# Patient Record
Sex: MALE | Race: WHITE | NOT HISPANIC OR LATINO | Employment: FULL TIME | URBAN - METROPOLITAN AREA
[De-identification: names, ages, dates, MRNs, and addresses within clinical notes are randomized per-mention and may not be internally consistent; named-entity substitution may affect disease eponyms.]

---

## 2017-03-06 ENCOUNTER — DOCUMENTATION ONLY (OUTPATIENT)
Dept: LAB | Facility: CLINIC | Age: 27
End: 2017-03-06

## 2017-03-06 DIAGNOSIS — Z13.220 SCREENING CHOLESTEROL LEVEL: Primary | ICD-10-CM

## 2017-03-06 DIAGNOSIS — Z13.1 SCREENING FOR DIABETES MELLITUS: ICD-10-CM

## 2017-03-06 NOTE — PROGRESS NOTES
Reviewed. I can place orders.    Please let patient know he's about 4 months early for a physical - FYI.    Lamin Zuniga, SANTYS, PA-C

## 2017-03-06 NOTE — PROGRESS NOTES
Tried calling patient. His voicemail is full. Need to relay Naveed's message below.    Sandra Varela MA

## 2017-03-07 NOTE — PROGRESS NOTES
Called and spoke with patient. He is going to check with his insurance to see if they will cover his physical early. He will call back to reschedule if needed.    Sandra Varela MA

## 2017-03-08 DIAGNOSIS — Z13.220 SCREENING CHOLESTEROL LEVEL: ICD-10-CM

## 2017-03-08 DIAGNOSIS — Z13.1 SCREENING FOR DIABETES MELLITUS: ICD-10-CM

## 2017-03-08 PROCEDURE — 82947 ASSAY GLUCOSE BLOOD QUANT: CPT | Performed by: PHYSICIAN ASSISTANT

## 2017-03-08 PROCEDURE — 36415 COLL VENOUS BLD VENIPUNCTURE: CPT | Performed by: PHYSICIAN ASSISTANT

## 2017-03-08 PROCEDURE — 80061 LIPID PANEL: CPT | Performed by: PHYSICIAN ASSISTANT

## 2017-03-09 LAB
CHOLEST SERPL-MCNC: 163 MG/DL
GLUCOSE SERPL-MCNC: 86 MG/DL (ref 70–99)
HDLC SERPL-MCNC: 38 MG/DL
LDLC SERPL CALC-MCNC: 103 MG/DL
NONHDLC SERPL-MCNC: 125 MG/DL
TRIGL SERPL-MCNC: 108 MG/DL

## 2017-03-10 ENCOUNTER — OFFICE VISIT (OUTPATIENT)
Dept: FAMILY MEDICINE | Facility: CLINIC | Age: 27
End: 2017-03-10
Payer: COMMERCIAL

## 2017-03-10 VITALS
BODY MASS INDEX: 25.99 KG/M2 | SYSTOLIC BLOOD PRESSURE: 118 MMHG | TEMPERATURE: 98.2 F | DIASTOLIC BLOOD PRESSURE: 76 MMHG | WEIGHT: 171.5 LBS | HEART RATE: 60 BPM | HEIGHT: 68 IN

## 2017-03-10 DIAGNOSIS — Z00.00 ROUTINE GENERAL MEDICAL EXAMINATION AT A HEALTH CARE FACILITY: Primary | ICD-10-CM

## 2017-03-10 DIAGNOSIS — M45.9 AS (ANKYLOSING SPONDYLITIS) (H): ICD-10-CM

## 2017-03-10 PROCEDURE — 99395 PREV VISIT EST AGE 18-39: CPT | Performed by: PHYSICIAN ASSISTANT

## 2017-03-10 NOTE — MR AVS SNAPSHOT
After Visit Summary   3/10/2017    Dayday Buck    MRN: [id number]           Patient Information     Date Of Birth          1990        Visit Information        Provider Department      3/10/2017 10:00 AM Lamin Zuniga PA-C [facility]        Today's Diagnoses     Routine general medical examination at a health care facility    -  1    AS (ankylosing spondylitis) (H)          Care Instructions      Preventive Health Recommendations  Male Ages 26 - 39    Yearly exam:             See your health care provider every year in order to  o   Review health changes.   o   Discuss preventive care.    o   Review your medicines if your doctor has prescribed any.    You should be tested each year for STDs (sexually transmitted diseases), if you re at risk.     After age 35, talk to your provider about cholesterol testing. If you are at risk for heart disease, have your cholesterol tested at least every 5 years.     If you are at risk for diabetes, you should have a diabetes test (fasting glucose).  Shots: Get a flu shot each year. Get a tetanus shot every 10 years.     Nutrition:    Eat at least 5 servings of fruits and vegetables daily.     Eat whole-grain bread, whole-wheat pasta and brown rice instead of white grains and rice.     Talk to your provider about Calcium and Vitamin D.     Lifestyle    Exercise for at least 150 minutes a week (30 minutes a day, 5 days a week). This will help you control your weight and prevent disease.     Limit alcohol to one drink per day.     No smoking.     Wear sunscreen to prevent skin cancer.     See your dentist every six months for an exam and cleaning.           Follow-ups after your visit        Additional Services     OPHTHALMOLOGY ADULT REFERRAL       Your provider has referred you to: [facility]: [facility] [place] [phone]   [email or web address]  UMP: [facility] [place] [phone]   [email or web address]    Please be aware that coverage of these services is subject to the terms and limitations of your health insurance plan.  Call member services at your health plan with any benefit or coverage questions.      Please bring the following with you to your appointment:    (1) Any X-Rays, CTs or MRIs which have been performed.  Contact the facility where they were done to arrange for  prior to your scheduled appointment.    (2) List of current medications  (3) This referral request   (4) Any documents/labs given to you for this referral            RHEUMATOLOGY REFERRAL       Your provider has referred you to: FMG: [facility] [place] [phone]   [email or web address]  FMG: [place] [facility] [place] [phone]   [email or web address]    Please be aware that coverage of these services is subject to the terms and limitations of your health insurance plan.  Call member services at your health plan with any benefit or coverage questions.      Please bring the following with you to your appointment:    (1) Any X-Rays, CTs or MRIs which have been performed.  Contact the facility where they were done to arrange for  prior to your scheduled appointment.    (2) List of current medications   (3) This referral request   (4) Any documents/labs given to you for this referral                  Who to contact     If you have questions or need follow up information about today's clinic visit or your schedule please contact [facility] directly at [phone].  Normal or non-critical lab and imaging results will be communicated to you by MyChart, letter or phone within 4 business days after the clinic has received the results. If you do not hear from us within 7 days, please contact the clinic through MyChart or phone. If you have a critical or  "abnormal lab result, we will notify you by phone as soon as possible.  Submit refill requests through [employer] or call your pharmacy and they will forward the refill request to us. Please allow 3 business days for your refill to be completed.          Additional Information About Your Visit        MyChart Information     [employer] lets you send messages to your doctor, view your test results, renew your prescriptions, schedule appointments and more. To sign up, go to [email or web address] . Click on \"Log in\" on the left side of the screen, which will take you to the Welcome page. Then click on \"Sign up Now\" on the right side of the page.     You will be asked to enter the access code listed below, as well as some personal information. Please follow the directions to create your username and password.     Your access code is: [email or web address]  Expires: 2017 11:04 AM     Your access code will  in 90 days. If you need help or a new code, please call your [place] clinic or [phone].        Care EveryWhere ID     This is your Care EveryWhere ID. This could be used by other organizations to access your [place] medical records  [id number]        Your Vitals Were     Pulse Temperature Height BMI (Body Mass Index)          60 98.2  F (36.8  C) (Oral) 5' 8\" (1.727 m) 26.08 kg/m2         Blood Pressure from Last 3 Encounters:   03/10/17 118/76   16 123/76    Weight from Last 3 Encounters:   03/10/17 171 lb 8 oz (77.8 kg)   16 174 lb 4.8 oz (79.1 kg)              We Performed the Following     OPHTHALMOLOGY ADULT REFERRAL     RHEUMATOLOGY REFERRAL        Primary Care Provider Office Phone # Fax #    Irish Alicia [phone]       [facility]    [address]   [facility] [address]        Thank you!     Thank you for choosing [facility]  for your care. Our goal is always to provide you with excellent care. Hearing back from our " patients is one way we can continue to improve our services. Please take a few minutes to complete the written survey that you may receive in the mail after your visit with us. Thank you!             Your Updated Medication List - Protect others around you: Learn how to safely use, store and throw away your medicines at www.disposemymeds.org.          This list is accurate as of: 3/10/17 11:04 AM.  Always use your most recent med list.                   Brand Name Dispense Instructions for use    adalimumab 40 MG/0.8ML prefilled syringe kit    HUMIRA     Inject 40 mg Subcutaneous every 14 days

## 2017-03-10 NOTE — NURSING NOTE
"Chief Complaint   Patient presents with     Physical     Establish Care       Initial There were no vitals taken for this visit. Estimated body mass index is 25.74 kg/(m^2) as calculated from the following:    Height as of 7/18/16: 5' 9\" (1.753 m).    Weight as of 7/18/16: 174 lb 4.8 oz (79.1 kg).  Medication Reconciliation: complete   Courtney Barba CMA      "

## 2017-03-10 NOTE — PROGRESS NOTES
SUBJECTIVE:     CC: Dayday Buck is an 26 year old male who presents for preventative health visit.     Healthy Habits:    Do you get at least three servings of calcium containing foods daily (dairy, green leafy vegetables, etc.)? yes    Amount of exercise or daily activities, outside of work: 5 day(s) per week    Problems taking medications regularly No    Medication side effects: No    Have you had an eye exam in the past two years? no    Do you see a dentist twice per year? no  Do you have sleep apnea, excessive snoring or daytime drowsiness?no      Patient would like to get referral for eye doctor.  Patient would also like to Establish Care and discuss getting updated lab work done.    Today's PHQ-2 Score:   PHQ-2 ( 1999 Pfizer) 3/10/2017 7/18/2016   Q1: Little interest or pleasure in doing things 0 0   Q2: Feeling down, depressed or hopeless 0 0   PHQ-2 Score 0 0       Abuse: Current or Past(Physical, Sexual or Emotional)- No  Do you feel safe in your environment - Yes    Social History   Substance Use Topics     Smoking status: Never Smoker     Smokeless tobacco: Never Used     Alcohol use 0.0 oz/week     0 Standard drinks or equivalent per week      Comment: 1 drinks a week     The patient does not drink >3 drinks per day nor >7 drinks per week.    Last PSA: No results found for: PSA    Recent Labs   Lab Test  03/08/17   1108   CHOL  163   HDL  38*   LDL  103*   TRIG  108   NHDL  125       Reviewed orders with patient. Reviewed health maintenance and updated orders accordingly - Yes    Reviewed and updated as needed this visit by clinical staff  Tobacco  Allergies  Med Hx  Surg Hx  Fam Hx  Soc Hx        Reviewed and updated as needed this visit by Provider  Allergies            ROS:  C: NEGATIVE for fever, chills, change in weight  I: NEGATIVE for worrisome rashes, moles or lesions  E: NEGATIVE for vision changes or irritation  ENT: NEGATIVE for ear, mouth and throat problems  R: NEGATIVE for  "significant cough or SOB  CV: NEGATIVE for chest pain, palpitations or peripheral edema  GI: NEGATIVE for nausea, abdominal pain, heartburn, or change in bowel habits   male: negative for dysuria, hematuria, decreased urinary stream, erectile dysfunction, urethral discharge  M: NEGATIVE for significant arthralgias or myalgia  N: NEGATIVE for weakness, dizziness or paresthesias  P: NEGATIVE for changes in mood or affect    Labs reviewed in EPIC  OBJECTIVE:     /76 (BP Location: Right arm, Cuff Size: Adult Regular)  Pulse 60  Temp 98.2  F (36.8  C) (Oral)  Ht 5' 8\" (1.727 m)  Wt 171 lb 8 oz (77.8 kg)  BMI 26.08 kg/m2  EXAM:  GENERAL: healthy, alert and no distress  EYES: Eyes grossly normal to inspection, PERRL and conjunctivae and sclerae normal  HENT: ear canals and TM's normal, nose and mouth without ulcers or lesions  NECK: no adenopathy, no asymmetry, masses, or scars and thyroid normal to palpation  RESP: lungs clear to auscultation - no rales, rhonchi or wheezes  CV: regular rate and rhythm, normal S1 S2, no S3 or S4, no murmur, click or rub, no peripheral edema and peripheral pulses strong  ABDOMEN: soft, nontender, no hepatosplenomegaly, no masses and bowel sounds normal  MS: no gross musculoskeletal defects noted, no edema  SKIN: no suspicious lesions or rashes  NEURO: Normal strength and tone, mentation intact and speech normal  PSYCH: mentation appears normal, affect normal/bright  LYMPH: no cervical, supraclavicular, axillary, or inguinal adenopathy    ASSESSMENT/PLAN:     (Z00.00) Routine general medical examination at a health care facility  (primary encounter diagnosis)  Comment: Well person   Plan: OPHTHALMOLOGY ADULT REFERRAL        Diet, exercise, wellness and other preventive recommendations related to health maintenance were discussed.  Follow up as needed for acute issues.  Physical exam in 1 year.     (M45.9) AS (ankylosing spondylitis) (H)  Comment:   Plan: RHEUMATOLOGY REFERRAL        " "Needs a new rheumatologist       COUNSELING:  Reviewed preventive health counseling, as reflected in patient instructions         reports that he has never smoked. He has never used smokeless tobacco.    Estimated body mass index is 26.08 kg/(m^2) as calculated from the following:    Height as of this encounter: 5' 8\" (1.727 m).    Weight as of this encounter: 171 lb 8 oz (77.8 kg).       Counseling Resources:  ATP IV Guidelines  Pooled Cohorts Equation Calculator  FRAX Risk Assessment  ICSI Preventive Guidelines  Dietary Guidelines for Americans, 2010  USDA's MyPlate  ASA Prophylaxis  Lung CA Screening    FABIENNE ALEGRE PA-C  [facility]  "

## 2017-03-17 ENCOUNTER — MYC MEDICAL ADVICE (OUTPATIENT)
Dept: FAMILY MEDICINE | Facility: CLINIC | Age: 27
End: 2017-03-17

## 2017-03-17 NOTE — TELEPHONE ENCOUNTER
Huddled with provider. Okay to have patient see PCP next week as long as no focal neuro deficits. Abimbola sent, and called and left VM.    Tj Kilgore RN

## 2017-03-24 ENCOUNTER — OFFICE VISIT (OUTPATIENT)
Dept: FAMILY MEDICINE | Facility: CLINIC | Age: 27
End: 2017-03-24
Payer: COMMERCIAL

## 2017-03-24 VITALS
HEART RATE: 64 BPM | DIASTOLIC BLOOD PRESSURE: 68 MMHG | HEIGHT: 68 IN | BODY MASS INDEX: 26.52 KG/M2 | WEIGHT: 175 LBS | SYSTOLIC BLOOD PRESSURE: 118 MMHG | TEMPERATURE: 98.6 F

## 2017-03-24 DIAGNOSIS — R55 SYNCOPE, UNSPECIFIED SYNCOPE TYPE: Primary | ICD-10-CM

## 2017-03-24 LAB
ALBUMIN SERPL-MCNC: 4.3 G/DL (ref 3.4–5)
ALP SERPL-CCNC: 71 U/L (ref 40–150)
ALT SERPL W P-5'-P-CCNC: 29 U/L (ref 0–70)
ANION GAP SERPL CALCULATED.3IONS-SCNC: 6 MMOL/L (ref 3–14)
AST SERPL W P-5'-P-CCNC: 24 U/L (ref 0–45)
BILIRUB SERPL-MCNC: 0.5 MG/DL (ref 0.2–1.3)
BUN SERPL-MCNC: 13 MG/DL (ref 7–30)
CALCIUM SERPL-MCNC: 8.7 MG/DL (ref 8.5–10.1)
CHLORIDE SERPL-SCNC: 104 MMOL/L (ref 94–109)
CO2 SERPL-SCNC: 29 MMOL/L (ref 20–32)
CREAT SERPL-MCNC: 0.82 MG/DL (ref 0.66–1.25)
ERYTHROCYTE [DISTWIDTH] IN BLOOD BY AUTOMATED COUNT: 11.9 % (ref 10–15)
ERYTHROCYTE [SEDIMENTATION RATE] IN BLOOD BY WESTERGREN METHOD: 7 MM/H (ref 0–15)
GFR SERPL CREATININE-BSD FRML MDRD: NORMAL ML/MIN/1.7M2
GLUCOSE SERPL-MCNC: 80 MG/DL (ref 70–99)
HCT VFR BLD AUTO: 40.6 % (ref 40–53)
HGB BLD-MCNC: 14.2 G/DL (ref 13.3–17.7)
MCH RBC QN AUTO: 30.3 PG (ref 26.5–33)
MCHC RBC AUTO-ENTMCNC: 35 G/DL (ref 31.5–36.5)
MCV RBC AUTO: 87 FL (ref 78–100)
PLATELET # BLD AUTO: 216 10E9/L (ref 150–450)
POTASSIUM SERPL-SCNC: 4.1 MMOL/L (ref 3.4–5.3)
PROT SERPL-MCNC: 7.6 G/DL (ref 6.8–8.8)
RBC # BLD AUTO: 4.68 10E12/L (ref 4.4–5.9)
SODIUM SERPL-SCNC: 139 MMOL/L (ref 133–144)
TSH SERPL DL<=0.05 MIU/L-ACNC: 3.71 MU/L (ref 0.4–4)
VIT B12 SERPL-MCNC: 476 PG/ML (ref 193–986)
WBC # BLD AUTO: 6.3 10E9/L (ref 4–11)

## 2017-03-24 PROCEDURE — 80053 COMPREHEN METABOLIC PANEL: CPT | Performed by: PHYSICIAN ASSISTANT

## 2017-03-24 PROCEDURE — 85027 COMPLETE CBC AUTOMATED: CPT | Performed by: PHYSICIAN ASSISTANT

## 2017-03-24 PROCEDURE — 93000 ELECTROCARDIOGRAM COMPLETE: CPT | Performed by: PHYSICIAN ASSISTANT

## 2017-03-24 PROCEDURE — 84443 ASSAY THYROID STIM HORMONE: CPT | Performed by: PHYSICIAN ASSISTANT

## 2017-03-24 PROCEDURE — 36415 COLL VENOUS BLD VENIPUNCTURE: CPT | Performed by: PHYSICIAN ASSISTANT

## 2017-03-24 PROCEDURE — 85652 RBC SED RATE AUTOMATED: CPT | Performed by: PHYSICIAN ASSISTANT

## 2017-03-24 PROCEDURE — 82607 VITAMIN B-12: CPT | Performed by: PHYSICIAN ASSISTANT

## 2017-03-24 PROCEDURE — 99214 OFFICE O/P EST MOD 30 MIN: CPT | Performed by: PHYSICIAN ASSISTANT

## 2017-03-24 NOTE — MR AVS SNAPSHOT
After Visit Summary   3/24/2017    Dayday Buck    MRN: [id number]           Patient Information     Date Of Birth          1990        Visit Information        Provider Department      3/24/2017 9:00 AM Lamin Zuniga PA-C [facility]        Today's Diagnoses     Syncope, unspecified syncope type    -  1      Care Instructions    [facility]   Discharged by : Lorraine LEWIS Certified Medical Assistant (AAMA)   Paper scripts provided to patient : 0   If you have any questions regarding to your visit please contact your care team:   [facility] Clinic Hours Telephone Number   JAN Pete Dr., PA-C    7am-7pm Monday - Thursday   7am-5pm Fridays  [phone]   (Appointment scheduling available 24/7)   RN Line   [phone] option 2       What options do I have for visits at the clinic other than the traditional office visit?   To expand how we care for you, many of our providers are utilizing electronic visits (e-visits) and telephone visits, when medically appropriate, for interactions with their patients rather than a visit in the clinic. We also offer nurse visits for many medical concerns. Just like any other service, we will bill your insurance company for this type of visit based on time spent on the phone with your provider. Not all insurance companies cover these visits. Please check with your medical insurance if this type of visit is covered. You will be responsible for any charges that are not paid by your insurance.     E-visits via [employer]: generally incur a $35.00 fee.     Telephone visits:   Time spent on the phone: *charged based on time that is spent on the phone in increments of 10 minutes. Estimated cost:   5-10 mins $30.00   11-20 mins. $59.00   21-30 mins. $85.00   Use [employer] (secure email communication and access to your chart) to send your primary care provider a message or make an  "appointment. Ask someone on your Team how to sign up for [employer].   For a Price Quote for your services, please call our Consumer Price Line at [phone].   As always, Thank you for trusting us with your health care needs!                [facility]:    Scheduling Appointments  [facility]  Call: [phone]    [facility]  Call: [phone]    [facility]  Call: [phone]                  Follow-ups after your visit        Who to contact     If you have questions or need follow up information about today's clinic visit or your schedule please contact [facility] directly at [phone].  Normal or non-critical lab and imaging results will be communicated to you by Cloud[employer], letter or phone within 4 business days after the clinic has received the results. If you do not hear from us within 7 days, please contact the clinic through [employer] or phone. If you have a critical or abnormal lab result, we will notify you by phone as soon as possible.  Submit refill requests through [employer] or call your pharmacy and they will forward the refill request to us. Please allow 3 business days for your refill to be completed.          Additional Information About Your Visit        [employer] Information     [employer] gives you secure access to your electronic health record. If you see a primary care provider, you can also send messages to your care team and make appointments. If you have questions, please call your primary care clinic.  If you do not have a primary care provider, please call [phone] and they will assist you.        Care EveryWhere ID     This is your Care EveryWhere ID. This could be used by other organizations to access your [place] medical records  [id number]        Your Vitals Were     Pulse Temperature Height BMI (Body Mass Index)          64 98.6  F (37  C) (Oral) 5' 8\" (1.727 m) 26.61 kg/m2 "         Blood Pressure from Last 3 Encounters:   03/24/17 118/68   03/10/17 118/76   07/18/16 123/76    Weight from Last 3 Encounters:   03/24/17 175 lb (79.4 kg)   03/10/17 171 lb 8 oz (77.8 kg)   07/18/16 174 lb 4.8 oz (79.1 kg)              We Performed the Following     CBC with platelets     Comprehensive metabolic panel (BMP + Alb, Alk Phos, ALT, AST, Total. Bili, TP)     EKG 12-lead complete w/read - Clinics     ESR: Erythrocyte sedimentation rate     TSH     Vitamin B12        Primary Care Provider Office Phone # Fax #    Lamin Zuniga PA-C [phone]       [address] [address]        Thank you!     Thank you for choosing [facility]  for your care. Our goal is always to provide you with excellent care. Hearing back from our patients is one way we can continue to improve our services. Please take a few minutes to complete the written survey that you may receive in the mail after your visit with us. Thank you!             Your Updated Medication List - Protect others around you: Learn how to safely use, store and throw away your medicines at www.disposemymeds.org.          This list is accurate as of: 3/24/17  9:45 AM.  Always use your most recent med list.                   Brand Name Dispense Instructions for use    adalimumab 40 MG/0.8ML prefilled syringe kit    HUMIRA     Inject 40 mg Subcutaneous every 14 days

## 2017-03-24 NOTE — NURSING NOTE
"Chief Complaint   Patient presents with     Syncope       Initial /68 (BP Location: Right arm, Cuff Size: Adult Large)  Pulse 64  Temp 98.6  F (37  C) (Oral)  Ht 5' 8\" (1.727 m)  Wt 175 lb (79.4 kg)  BMI 26.61 kg/m2 Estimated body mass index is 26.61 kg/(m^2) as calculated from the following:    Height as of this encounter: 5' 8\" (1.727 m).    Weight as of this encounter: 175 lb (79.4 kg).  Medication Reconciliation: complete     Sandra Varela MA     "

## 2017-03-24 NOTE — PATIENT INSTRUCTIONS
[facility]   Discharged by : Lorraine LEWIS Certified Medical Assistant (AAMA)   Paper scripts provided to patient : 0   If you have any questions regarding to your visit please contact your care team:   [facility] Clinic Hours Telephone Number   JAN Pete Dr., PA-C    7am-7pm Monday - Thursday   7am-5pm Fridays  [phone]   (Appointment scheduling available 24/7)   RN Line   [phone] option 2       What options do I have for visits at the clinic other than the traditional office visit?   To expand how we care for you, many of our providers are utilizing electronic visits (e-visits) and telephone visits, when medically appropriate, for interactions with their patients rather than a visit in the clinic. We also offer nurse visits for many medical concerns. Just like any other service, we will bill your insurance company for this type of visit based on time spent on the phone with your provider. Not all insurance companies cover these visits. Please check with your medical insurance if this type of visit is covered. You will be responsible for any charges that are not paid by your insurance.     E-visits via Captalishart: generally incur a $35.00 fee.     Telephone visits:   Time spent on the phone: *charged based on time that is spent on the phone in increments of 10 minutes. Estimated cost:   5-10 mins $30.00   11-20 mins. $59.00   21-30 mins. $85.00   Use Captalishart (secure email communication and access to your chart) to send your primary care provider a message or make an appointment. Ask someone on your Team how to sign up for [employer].   For a Price Quote for your services, please call our Consumer Price Line at [phone].   As always, Thank you for trusting us with your health care needs!                [facility] [facility]:    Scheduling Appointments  Rob Bateman [facility]  Call: [phone]    [facility]  Call: [phone]    [facility]  Call: [phone]

## 2017-03-24 NOTE — PROGRESS NOTES
"  SUBJECTIVE:                                                    Dayday Buck is a 26 year old male who presents to clinic today for the following health issues:    Concern - syncope     Onset: one week ago    Description:   Patient fainted one week ago. Patient states that he didn't feel good that day. He had a headache and felt tired. He thinks he was unconscious for 30-60 seconds. He hit his forehead and left arm.    Intensity: NA    Progression of Symptoms:  NA    Accompanying Signs & Symptoms:  Felt hot, elevated heart rate, nausea       Previous history of similar problem:   none    Precipitating factors:   Worsened by: na    Alleviating factors:  Improved by: na       Therapies Tried and outcome: Fluids     No triggers. Was feeling mildly unwell perhaps earlier in the week. Denies illness though. 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 His protein, electrolyte intakes are good.     He states he was watching [employer] games, stood up and went to Urinate and was standing there a while when he felt racing heart and sudden severe nausea. Leaned forward and next thing he knew he was waking. Was out for at 30-60 seconds. No confusion or loss of bowel after.     He's been feeling fine since that time.     Problem list and histories reviewed & adjusted, as indicated.  Additional history: as documented    Labs reviewed in EPIC    Reviewed and updated as needed this visit by clinical staff       Reviewed and updated as needed this visit by Provider         ROS:  Constitutional, HEENT, cardiovascular, pulmonary, gi and gu systems are negative, except as otherwise noted.    OBJECTIVE:                                                    /68 (BP Location: Right arm, Cuff Size: Adult Large)  Pulse 64  Temp 98.6  F (37  C) (Oral)  Ht 5' 8\" (1.727 m)  Wt 175 lb (79.4 kg)  BMI 26.61 kg/m2  Body mass index is 26.61 kg/(m^2).  GENERAL: healthy, alert and no " distress  EYES: Eyes grossly normal to inspection, PERRL and conjunctivae and sclerae normal  HENT: ear canals and TM's normal, nose and mouth without ulcers or lesions  NECK: no adenopathy, no asymmetry, masses, or scars and thyroid normal to palpation  RESP: lungs clear to auscultation - no rales, rhonchi or wheezes  CV: regular rate and rhythm, normal S1 S2, no S3 or S4, no murmur, click or rub, no peripheral edema and peripheral pulses strong  ABDOMEN: soft, nontender, no hepatosplenomegaly, no masses and bowel sounds normal  MS: no gross musculoskeletal defects noted, no edema  SKIN: no suspicious lesions or rashes  NEURO: Normal strength and tone, mentation intact and speech normal  PSYCH: mentation appears normal, affect normal/bright    Diagnostic Test Results:  EKG - sinus  Bradycardia but normal      ASSESSMENT/PLAN:                                                      (R55) Syncope, unspecified syncope type  (primary encounter diagnosis)  Comment:   Plan: EKG 12-lead complete w/read - Clinics,         Comprehensive metabolic panel (BMP + Alb, Alk         Phos, ALT, AST, Total. Bili, TP), CBC with         platelets, ESR: Erythrocyte sedimentation rate,        TSH, Vitamin B12        Unclear cause. Reviewed differential. No signs or symptoms of seizure activity. Doesn't sound cardiac. Probably vasovagal from being mildly ill and standing up to use restroom and urinating. Reviewed plan to monitor and watch for symptoms. Labs ordered. His Sinus Bradycardia is probably baseline because he's a long distance athlete. Ensure good hydration and electrolyte repletion     FABIENNE ALEGRE PA-C  [facility]

## 2017-10-10 ENCOUNTER — OFFICE VISIT (OUTPATIENT)
Dept: RHEUMATOLOGY | Facility: CLINIC | Age: 27
End: 2017-10-10
Payer: COMMERCIAL

## 2017-10-10 VITALS
SYSTOLIC BLOOD PRESSURE: 130 MMHG | DIASTOLIC BLOOD PRESSURE: 76 MMHG | RESPIRATION RATE: 14 BRPM | OXYGEN SATURATION: 98 % | HEART RATE: 48 BPM | BODY MASS INDEX: 27.16 KG/M2 | WEIGHT: 178.6 LBS

## 2017-10-10 DIAGNOSIS — Z23 NEED FOR PROPHYLACTIC VACCINATION AND INOCULATION AGAINST INFLUENZA: ICD-10-CM

## 2017-10-10 DIAGNOSIS — M45.8 ANKYLOSING SPONDYLITIS OF SACRAL REGION (H): Primary | ICD-10-CM

## 2017-10-10 LAB
ALBUMIN SERPL-MCNC: 4.3 G/DL (ref 3.4–5)
ALP SERPL-CCNC: 67 U/L (ref 40–150)
ALT SERPL W P-5'-P-CCNC: 47 U/L (ref 0–70)
AST SERPL W P-5'-P-CCNC: 31 U/L (ref 0–45)
BASOPHILS # BLD AUTO: 0 10E9/L (ref 0–0.2)
BASOPHILS NFR BLD AUTO: 0.3 %
BILIRUB DIRECT SERPL-MCNC: 0.1 MG/DL (ref 0–0.2)
BILIRUB SERPL-MCNC: 0.6 MG/DL (ref 0.2–1.3)
CREAT SERPL-MCNC: 1.08 MG/DL (ref 0.66–1.25)
CRP SERPL-MCNC: <2.9 MG/L (ref 0–8)
DIFFERENTIAL METHOD BLD: NORMAL
EOSINOPHIL # BLD AUTO: 0.3 10E9/L (ref 0–0.7)
EOSINOPHIL NFR BLD AUTO: 3.9 %
ERYTHROCYTE [DISTWIDTH] IN BLOOD BY AUTOMATED COUNT: 11.8 % (ref 10–15)
ERYTHROCYTE [SEDIMENTATION RATE] IN BLOOD BY WESTERGREN METHOD: 6 MM/H (ref 0–15)
GFR SERPL CREATININE-BSD FRML MDRD: 81 ML/MIN/1.7M2
HCT VFR BLD AUTO: 40.3 % (ref 40–53)
HGB BLD-MCNC: 14.5 G/DL (ref 13.3–17.7)
LYMPHOCYTES # BLD AUTO: 2.2 10E9/L (ref 0.8–5.3)
LYMPHOCYTES NFR BLD AUTO: 32 %
MCH RBC QN AUTO: 30.9 PG (ref 26.5–33)
MCHC RBC AUTO-ENTMCNC: 36 G/DL (ref 31.5–36.5)
MCV RBC AUTO: 86 FL (ref 78–100)
MONOCYTES # BLD AUTO: 0.7 10E9/L (ref 0–1.3)
MONOCYTES NFR BLD AUTO: 10.4 %
NEUTROPHILS # BLD AUTO: 3.7 10E9/L (ref 1.6–8.3)
NEUTROPHILS NFR BLD AUTO: 53.4 %
PLATELET # BLD AUTO: 238 10E9/L (ref 150–450)
PROT SERPL-MCNC: 7.8 G/DL (ref 6.8–8.8)
RBC # BLD AUTO: 4.7 10E12/L (ref 4.4–5.9)
WBC # BLD AUTO: 7 10E9/L (ref 4–11)

## 2017-10-10 PROCEDURE — 36415 COLL VENOUS BLD VENIPUNCTURE: CPT | Performed by: INTERNAL MEDICINE

## 2017-10-10 PROCEDURE — 90472 IMMUNIZATION ADMIN EACH ADD: CPT | Performed by: INTERNAL MEDICINE

## 2017-10-10 PROCEDURE — 99244 OFF/OP CNSLTJ NEW/EST MOD 40: CPT | Mod: 25 | Performed by: INTERNAL MEDICINE

## 2017-10-10 PROCEDURE — 82565 ASSAY OF CREATININE: CPT | Performed by: INTERNAL MEDICINE

## 2017-10-10 PROCEDURE — 86140 C-REACTIVE PROTEIN: CPT | Performed by: INTERNAL MEDICINE

## 2017-10-10 PROCEDURE — 90732 PPSV23 VACC 2 YRS+ SUBQ/IM: CPT | Performed by: INTERNAL MEDICINE

## 2017-10-10 PROCEDURE — 90471 IMMUNIZATION ADMIN: CPT | Performed by: INTERNAL MEDICINE

## 2017-10-10 PROCEDURE — 85652 RBC SED RATE AUTOMATED: CPT | Performed by: INTERNAL MEDICINE

## 2017-10-10 PROCEDURE — 86480 TB TEST CELL IMMUN MEASURE: CPT | Performed by: INTERNAL MEDICINE

## 2017-10-10 PROCEDURE — 80076 HEPATIC FUNCTION PANEL: CPT | Performed by: INTERNAL MEDICINE

## 2017-10-10 PROCEDURE — 90686 IIV4 VACC NO PRSV 0.5 ML IM: CPT | Performed by: INTERNAL MEDICINE

## 2017-10-10 PROCEDURE — 85025 COMPLETE CBC W/AUTO DIFF WBC: CPT | Performed by: INTERNAL MEDICINE

## 2017-10-10 NOTE — PROGRESS NOTES
Rheumatology Clinic Visit      Dayday Buck MRN# [id number]   YOB: 1990 Age: 26 year old      Date of visit: 10/10/17   Referring provider: Lamin Zuniga  PCP: Lamin Zuniga    Chief Complaint   Patient presents with:  Consult: ankylosing spondylitis - [place]       Assessment and Plan     1. Ankylosing Spondylitis: Diagnosed in 2014 by Dr. Tammi Murrieta, rheumatologist in [place], [place].  8/22/2014 [facility] MRI of the right hip showed sacroiliitis.  He has been on Humira since 2014 with resolution of his initial symptoms that included pain and stiffness of his bilateral hips, lower back, and neck; Humira was not used for 1.5 months because of issues with getting it filled that resulted in recurrence of back and neck stiffness and pan.  We reviewed the diagnosis of AS today.  He is doing well with Humira and will therefore continue it.    - Continue Humira 40mg SQ every 14 days  - Labs today: CBC, Cr, Hepatic Panel, ESR, CRP, Quantiferon TB    # Adalimumab (Humira) Risks and Benefits: The risks and benefits of adalimumab were discussed in detail and the patient verbalized understanding.  The risks discussed include, but are not limited to, the risk for hypersensitivity, anaphylaxis, anaphylactoid reactions, an increased risk for serious infections leading to hospitalization or death, a possible increased risk for lymphoma and other malignancies, a possible worsening of demyelinating diseases, a possible worsening of heart failure, risk for cytopenias, risk for drug induced lupus, possible reactivation of hepatitis B, and possible reactivation of latent tuberculosis.  Subcutaneous injections may result in injection site reactions and/or pain at the site of injection.  The most common adverse reactions are infections, injection site reactions, headache, and rash.  It was discussed that the medication would need to be discontinued if a serious infection  develops.  It was discussed that live vaccinations should not be received while using adalimumab or within 30 days prior to starting adalimumab.  I encouraged reviewing the package insert and asking any questions about the medication.      2. Vaccinations: Vaccinations reviewed with Mr. Buck.  Risks and benefits of vaccinations were discussed.  - Influenza: will receive today  - [id number]: up to date; received 1/21/2016 per records from [facility]  - [id number]: will receive today  - Zostavax: up to date; received 7/10/2015 per records from [facility]      Mr. Buck verbalized agreement with and understanding of the rational for the diagnosis and treatment plan.  All questions were answered to best of my ability and the patient's satisfaction. Mr. Buck was advised to contact the clinic with any questions that may arise after the clinic visit.      Thank you for involving me in the care of the patient    Return to clinic: 6 months, sooner if needed      HPI   Dayday Buck is a 26 year old male with a past medical history significant for ankylosing spondylitis who is seen in consultation at the request of Lamin Zuniga for evaluation of ankylosing spondylitis.    10/7/2016 rheumatology clinic note by Dr. Tammi Murrieta in [place], [place], documents HLA-B27 positive, sacroiliitis on MRI.  On Humira, naproxen PRN. Ankylosing Spondylitis.      2014 right hip MRI showed sacroiliitis    Today, Mr. Buck reports that he was diagnosed with ankylosing spondylitis in 2014. Initial symptoms of tightness and achiness in his hips, lower back, and neck. Pain and stiffness was worse with inactivity, better with activity. He was started on Humira and his symptoms resolved. There was a 1.5 month period of time where he had issues with getting the Humira filled and during that time he had worsening of his back and neck stiffness and achiness. He tolerates the  Humira injections well, but sometimes has had 2 red bumps occur on his abdomen about one week after the injection that are nonpainful and nonpruritic; they resolve spontaneously within one day. He has not correlated these bumps with injection sites. Currently doing well on Humira with no joint pain or morning stiffness.    Denies fevers, chills, nausea, vomiting, constipation, diarrhea. No abdominal pain. No chest pain/pressure, palpitations, or shortness of breath. No LE swelling. No neck pain. No oral or nasal sores.  No sicca symptoms. No photosensitivity or photophobia. No eye pain or redness. No history of inflammatory eye disease.  No history of inflammatory bowel disease.    Denies FHx of autoimmune disease.      Tobacco: none  EtOH: 1 drink per week  Drugs: none  Occupation: ; [employer]    ROS   GEN: No fevers, chills, night sweats, or weight change  SKIN: See HPI  HEENT: No epistaxis. No oral or nasal ulcers.  CV: No chest pain, pressure, palpitations, or dyspnea on exertion.  PULM: No SOB, wheeze, cough.  GI: No nausea, vomiting, constipation, diarrhea. No blood in stool. No abdominal pain.  : No blood in urine.  MSK: See HPI.  NEURO: No numbness, tingling, or weakness.  EXT: No LE swelling  PSYCH: Negative    Active Problem List     Patient Active Problem List   Diagnosis     AS (ankylosing spondylitis) (H)     Acne vulgaris     Past Medical History     Past Medical History:   Diagnosis Date     Acne      AS (ankylosing spondylitis) (H)      Dyspepsia      Past Surgical History     Past Surgical History:   Procedure Laterality Date     HEAD & NECK SURGERY      wisdom tooth extraction     ORTHOPEDIC SURGERY      3 arthroscopic knee surgery for ACL repair     Allergy     Allergies   Allergen Reactions     No Known Allergies      Current Medication List     Current Outpatient Prescriptions   Medication Sig     adalimumab (HUMIRA PEN) 40 MG/0.8ML pen kit Inject 0.8 mLs (40 mg) Subcutaneous every  "14 days     [DISCONTINUED] adalimumab (HUMIRA) 40 MG/0.8ML prefilled syringe kit Inject 40 mg Subcutaneous every 14 days     No current facility-administered medications for this visit.      Social History   See HPI    Family History     Family History   Problem Relation Age of Onset     Family History Negative Mother      CANCER Father      skin cancer basal cell     Family History Negative Brother      Family History Negative Sister      Family History Negative Sister      Breast Cancer Maternal Aunt      No FHx of rheumatologic disease    Physical Exam     Temp Readings from Last 3 Encounters:   03/24/17 98.6  F (37  C) (Oral)   03/10/17 98.2  F (36.8  C) (Oral)   07/18/16 97.6  F (36.4  C) (Tympanic)     BP Readings from Last 5 Encounters:   10/10/17 130/76   03/24/17 118/68   03/10/17 118/76   07/18/16 123/76     Pulse Readings from Last 1 Encounters:   10/10/17 (!) 48     Resp Readings from Last 1 Encounters:   10/10/17 14     Estimated body mass index is 27.16 kg/(m^2) as calculated from the following:    Height as of 3/24/17: 1.727 m (5' 8\").    Weight as of this encounter: 81 kg (178 lb 9.6 oz).    GEN: NAD  HEENT: MMM. Anicteric, noninjected sclera  CV: S1, S2. RRR. No m/r/g.  PULM: CTA bilaterally. No w/c.  MSK:  Hands, wrists, elbows, shoulders, knees, ankles, and MTPs without swelling or tenderness to palpation.  Spine nontender to palpation.  Able to touch toes from a standing position.  Heel and occiput to wall without difficulty.    NEURO: UE and LE strengths 5/5 and equal bilaterally.   SKIN: No rash.  No psoriasis.  No nail pitting.  EXT: No LE edema  PSYCH: Alert. Appropriate.    Labs / Imaging (select studies)     CBC  Recent Labs   Lab Test  03/24/17   0953   WBC  6.3   RBC  4.68   HGB  14.2   HCT  40.6   MCV  87   RDW  11.9   PLT  216   MCH  30.3   MCHC  35.0     CMP  Recent Labs   Lab Test  03/24/17   0953  03/08/17   1108   NA  139   --    POTASSIUM  4.1   --    CHLORIDE  104   --    CO2  29  "  --    ANIONGAP  6   --    GLC  80  86   BUN  13   --    CR  0.82   --    GFRESTIMATED  >90  Non  GFR Calc     --    GFRESTBLACK  >90   GFR Calc     --    UZMA  8.7   --    BILITOTAL  0.5   --    ALBUMIN  4.3   --    PROTTOTAL  7.6   --    ALKPHOS  71   --    AST  24   --    ALT  29   --      Calcium/VitaminD  Recent Labs   Lab Test  03/24/17   0953   UZMA  8.7     ESR/CRP  Recent Labs   Lab Test  03/24/17   0953   SED  7     TSH/T4  Recent Labs   Lab Test  03/24/17   0953   TSH  3.71     Lipid Panel  Recent Labs   Lab Test  03/08/17   1108   CHOL  163   TRIG  108   HDL  38*   LDL  103*   NHDL  125     Immunization History     Immunization History   Administered Date(s) Administered     Pneumococcal (PCV 13) 01/21/2016     Tdap (Adacel,Boostrix) 04/27/2016          Chart documentation done in part with Dragon Voice recognition Software. Although reviewed after completion, some word and grammatical error may remain.    Bobby Middleton MD

## 2017-10-10 NOTE — MR AVS SNAPSHOT
After Visit Summary   10/10/2017    Dayday Buck    MRN: [id number]           Patient Information     Date Of Birth          1990        Visit Information        Provider Department      10/10/2017 1:20 PM Bobby Middleton MD [facility]        Today's Diagnoses     Ankylosing spondylitis of sacral region (H)    -  1       Follow-ups after your visit        Follow-up notes from your care team     Return in about 6 months (around 4/10/2018) for f/u AS.      Your next 10 appointments already scheduled     Apr 10, 2018  1:00 PM CDT   Return Visit with Bobby Middleton MD   [facility] ([facility])    [address] [place] [address] [phone]              Who to contact     If you have questions or need follow up information about today's clinic visit or your schedule please contact [facility] directly at [phone].  Normal or non-critical lab and imaging results will be communicated to you by [employer], letter or phone within 4 business days after the clinic has received the results. If you do not hear from us within 7 days, please contact the clinic through iZocat or phone. If you have a critical or abnormal lab result, we will notify you by phone as soon as possible.  Submit refill requests through Neuropure or call your pharmacy and they will forward the refill request to us. Please allow 3 business days for your refill to be completed.          Additional Information About Your Visit        MyChart Information     Neuropure gives you secure access to your electronic health record. If you see a primary care provider, you can also send messages to your care team and make appointments. If you have questions, please call your primary care clinic.  If you do not have a primary care provider, please call [phone] and they will assist you.        Care EveryWhere ID     This is your Care EveryWhere ID.  This could be used by other organizations to access your [facility] medical records  [id number]        Your Vitals Were     Pulse Respirations Pulse Oximetry BMI (Body Mass Index)          48 14 98% 27.16 kg/m2         Blood Pressure from Last 3 Encounters:   10/10/17 130/76   03/24/17 118/68   03/10/17 118/76    Weight from Last 3 Encounters:   10/10/17 81 kg (178 lb 9.6 oz)   03/24/17 79.4 kg (175 lb)   03/10/17 77.8 kg (171 lb 8 oz)              We Performed the Following     CBC with platelets differential     Creatinine     CRP inflammation     Erythrocyte sedimentation rate auto     Hepatic panel     M Tuberculosis by Quantiferon          Today's Medication Changes          These changes are accurate as of: 10/10/17  2:00 PM.  If you have any questions, ask your nurse or doctor.               Start taking these medicines.        Dose/Directions    adalimumab 40 MG/0.8ML pen kit   Commonly known as:  HUMIRA PEN   Used for:  Ankylosing spondylitis of sacral region (H)   Replaces:  adalimumab 40 MG/0.8ML prefilled syringe kit   Started by:  Bobby Middleton MD        Dose:  40 mg   Inject 0.8 mLs (40 mg) Subcutaneous every 14 days   Quantity:  2 each   Refills:  6         Stop taking these medicines if you haven't already. Please contact your care team if you have questions.     adalimumab 40 MG/0.8ML prefilled syringe kit   Commonly known as:  HUMIRA   Replaced by:  adalimumab 40 MG/0.8ML pen kit   Stopped by:  Bobby Middleton MD                Where to get your medicines      Call your pharmacy to confirm that your medication is ready for pickup. It may take up to 24 hours for them to receive the prescription. If the prescription is not ready within 3 business days, please contact your clinic or your provider.     We will let you know when these medications are ready. If you don't hear back within 3 business days, please contact us.     adalimumab 40 MG/0.8ML pen kit                Primary Care Provider Office  Phone # Fax #    Lamin Zuniga PA-C [phone] [address] [facility] [address]        Equal Access to Services     FANG ALEJO : Hadii aad ku hademmacarla Soskylaali, waaxda luqadaha, qaybta kaalmada adeegyada, nyla jigneshin hayaaclakr webberpeng gomezbruna dockery. So [facility] [phone].    ATENCIÓN: Si habla español, tiene a wise disposición servicios gratuitos de asistencia lingüística. Llame al [phone].    We comply with applicable federal civil rights laws and [place] laws. We do not discriminate on the basis of race, color, national origin, age, disability, sex, sexual orientation, or gender identity.            Thank you!     Thank you for choosing [facility]  for your care. Our goal is always to provide you with excellent care. Hearing back from our patients is one way we can continue to improve our services. Please take a few minutes to complete the written survey that you may receive in the mail after your visit with us. Thank you!             Your Updated Medication List - Protect others around you: Learn how to safely use, store and throw away your medicines at www.disposemymeds.org.          This list is accurate as of: 10/10/17  2:00 PM.  Always use your most recent med list.                   Brand Name Dispense Instructions for use Diagnosis    adalimumab 40 MG/0.8ML pen kit    HUMIRA PEN    2 each    Inject 0.8 mLs (40 mg) Subcutaneous every 14 days    Ankylosing spondylitis of sacral region (H)

## 2017-10-10 NOTE — NURSING NOTE
"Chief Complaint   Patient presents with     Consult       Initial /76 (BP Location: Right arm, Patient Position: Chair, Cuff Size: Adult Regular)  Pulse (!) 48  Resp 14  Wt 81 kg (178 lb 9.6 oz)  SpO2 98%  BMI 27.16 kg/m2 Estimated body mass index is 27.16 kg/(m^2) as calculated from the following:    Height as of 3/24/17: 1.727 m (5' 8\").    Weight as of this encounter: 81 kg (178 lb 9.6 oz).  BP completed using cuff size: regular         RAPID3 (0-30) Cumulative Score  3.0           RAPID3 Weighted Score (divide #4 by 3 and that is the weighted score)  1.0     Bess Murdock RN      "

## 2017-10-10 NOTE — PROGRESS NOTES
Injectable Influenza Immunization Documentation    1.  Is the person to be vaccinated sick today?   No    2. Does the person to be vaccinated have an allergy to a component   of the vaccine?   No    3. Has the person to be vaccinated ever had a serious reaction   to influenza vaccine in the past?   No    4. Has the person to be vaccinated ever had Guillain-Barré syndrome?   No    Form completed by Bess Murdock RN

## 2017-10-12 LAB
M TB TUBERC IFN-G BLD QL: NEGATIVE
M TB TUBERC IFN-G/MITOGEN IGNF BLD: 0 IU/ML

## 2018-01-29 ENCOUNTER — TELEPHONE (OUTPATIENT)
Dept: RHEUMATOLOGY | Facility: CLINIC | Age: 28
End: 2018-01-29

## 2018-01-29 NOTE — TELEPHONE ENCOUNTER
Form has been faxed to [facility], will be there on 1/30/2018 and PA will be done at that time.  Kassidy Dave, RYLIE  1/29/2018 3:50 PM

## 2018-01-29 NOTE — TELEPHONE ENCOUNTER
Reason for Call:  Other prescription    Detailed comments: Caller would like to know if you received the faxed PA for humira sent last Friday (1-26-18) to fax ([phone]).     Phone Number Patient can be reached at: Other phone number:  [phone]    Best Time: any    Can we leave a detailed message on this number? YES    Call taken on 1/29/2018 at 3:07 PM by Destiny Arthur

## 2018-02-02 ENCOUNTER — TELEPHONE (OUTPATIENT)
Dept: RHEUMATOLOGY | Facility: CLINIC | Age: 28
End: 2018-02-02

## 2018-04-10 ENCOUNTER — OFFICE VISIT (OUTPATIENT)
Dept: RHEUMATOLOGY | Facility: CLINIC | Age: 28
End: 2018-04-10
Payer: COMMERCIAL

## 2018-04-10 VITALS
HEART RATE: 63 BPM | RESPIRATION RATE: 16 BRPM | BODY MASS INDEX: 27.61 KG/M2 | SYSTOLIC BLOOD PRESSURE: 127 MMHG | DIASTOLIC BLOOD PRESSURE: 79 MMHG | HEIGHT: 68 IN | OXYGEN SATURATION: 98 % | WEIGHT: 182.2 LBS | TEMPERATURE: 97.8 F

## 2018-04-10 DIAGNOSIS — M45.8 ANKYLOSING SPONDYLITIS OF SACRAL REGION (H): ICD-10-CM

## 2018-04-10 PROCEDURE — 99213 OFFICE O/P EST LOW 20 MIN: CPT | Performed by: INTERNAL MEDICINE

## 2018-04-10 NOTE — NURSING NOTE
"Chief Complaint   Patient presents with     Arthritis     Patient states he is doing ok, still has some lower back pain once in awhile. Last injection he got a bruise that lasted 4 days, was also tender to the touch. This has never happened before.       Initial /79  Pulse 63  Temp 97.8  F (36.6  C) (Oral)  Resp 16  Ht 1.727 m (5' 8\")  Wt 82.6 kg (182 lb 3.2 oz)  SpO2 98%  BMI 27.7 kg/m2 Estimated body mass index is 27.7 kg/(m^2) as calculated from the following:    Height as of this encounter: 1.727 m (5' 8\").    Weight as of this encounter: 82.6 kg (182 lb 3.2 oz).  BP completed using cuff size: regular         RAPID3 (0-30) Cumulative Score  8.0          RAPID3 Weighted Score (divide #4 by 3 and that is the weighted score)  2.67         "

## 2018-04-10 NOTE — MR AVS SNAPSHOT
After Visit Summary   4/10/2018    Dayday Buck    MRN: [id number]           Patient Information     Date Of Birth          1990        Visit Information        Provider Department      4/10/2018 1:00 PM Bobby Middleton MD [facility]        Today's Diagnoses     Ankylosing spondylitis of sacral region (H)          Care Instructions    Rheumatology    Dr. Bobby [place] [facility]   (Monday)  [address]  [place] [address]       [facility]   (Tuesday)  [address] [place] [address]    [facility]   (Wed., Thurs., and Friday)  [address] [place] [address]    Phone number: [phone]  Thank you for choosing [facility].  Kassidy Dave CMA            Follow-ups after your visit        Your next 10 appointments already scheduled     Apr 09, 2019  1:00 PM CDT   Return Visit with Bobby Middleton MD   [facility] ([facility])    [address] [facility] [id number] [phone]              Who to contact     If you have questions or need follow up information about today's clinic visit or your schedule please contact [facility] directly at [phone].  Normal or non-critical lab and imaging results will be communicated to you by MyChart, letter or phone within 4 business days after the clinic has received the results. If you do not hear from us within 7 days, please contact the clinic through MyChart or phone. If you have a critical or abnormal lab result, we will notify you by phone as soon as possible.  Submit refill requests through RFMarq or call your pharmacy and they will forward the refill request to us. Please allow 3 business days for your refill to be completed.          Additional Information About Your Visit        [employer]er Information     RFMarq gives you secure access to your electronic health record. If you see a primary care  "provider, you can also send messages to your care team and make appointments. If you have questions, please call your primary care clinic.  If you do not have a primary care provider, please call [phone] and they will assist you.        Care EveryWhere ID     This is your Care EveryWhere ID. This could be used by other organizations to access your [facility] medical records  [id number]        Your Vitals Were     Pulse Temperature Respirations Height Pulse Oximetry BMI (Body Mass Index)    63 97.8  F (36.6  C) (Oral) 16 1.727 m (5' 8\") 98% 27.7 kg/m2       Blood Pressure from Last 3 Encounters:   04/10/18 127/79   10/10/17 130/76   03/24/17 118/68    Weight from Last 3 Encounters:   04/10/18 82.6 kg (182 lb 3.2 oz)   10/10/17 81 kg (178 lb 9.6 oz)   03/24/17 79.4 kg (175 lb)              Today, you had the following     No orders found for display         Where to get your medicines      Call your pharmacy to confirm that your medication is ready for pickup. It may take up to 24 hours for them to receive the prescription. If the prescription is not ready within 3 business days, please contact your clinic or your provider.     We will let you know when these medications are ready. If you don't hear back within 3 business days, please contact us.     adalimumab 40 MG/0.8ML pen kit          Primary Care Provider Office Phone # Fax #    Lamin Zuniga PA-C [phone]       [facility] [facility] [address]        Equal Access to Services     [facility]: Hadii thien crooko Soowen, waaxda luqadaha, qaybta kaalmanyla medrano . So [facility] [phone].    ATENCIÓN: Si habla español, tiene a wise disposición servicios gratuitos de asistencia lingüística. Llame al [phone].    We comply with applicable federal civil rights laws and [place] laws. We do not discriminate on the basis of race, color, national origin, age, disability, sex, sexual " orientation, or gender identity.            Thank you!     Thank you for choosing [facility]  for your care. Our goal is always to provide you with excellent care. Hearing back from our patients is one way we can continue to improve our services. Please take a few minutes to complete the written survey that you may receive in the mail after your visit with us. Thank you!             Your Updated Medication List - Protect others around you: Learn how to safely use, store and throw away your medicines at www.disposemymeds.org.          This list is accurate as of 4/10/18  1:26 PM.  Always use your most recent med list.                   Brand Name Dispense Instructions for use Diagnosis    adalimumab 40 MG/0.8ML pen kit    HUMIRA PEN    2 each    Inject 0.8 mLs (40 mg) Subcutaneous every 14 days    Ankylosing spondylitis of sacral region (H)

## 2018-04-10 NOTE — PATIENT INSTRUCTIONS
Rheumatology    Dr. Bobby Middleton         [facility]   (Monday)  [address]  [place] [address]       [facility]   (Tuesday)  [address]  [place] [address]    [facility]   (Wed., Thurs., and Friday)  [address] [place] [address]    Phone number: [phone]  Thank you for choosing [facility].  Kassidy Dave CMA

## 2018-04-10 NOTE — PROGRESS NOTES
Rheumatology Clinic Visit      Dayday Buck MRN# [id number]   YOB: 1990 Age: 27 year old      Date of visit: 4/10/18   Referring provider: Lamin Zuniga  PCP: Lamin Zuniga    Chief Complaint   Patient presents with:  Arthritis: Patient states he is doing ok, still has some lower back pain once in awhile. Last injection he got a bruise that lasted 4 days, was also tender to the touch. This has never happened before.      Assessment and Plan     1. Ankylosing Spondylitis: Diagnosed in 2014 by Dr. Tammi Murrieta, rheumatologist in [place], [place].  8/22/2014 [facility] MRI of the right hip showed sacroiliitis.  He has been on Humira since 2014 with resolution of his initial symptoms that included pain and stiffness of his bilateral hips, lower back, and neck; Humira was not used for 1.5 month period of time in the past because of issues with getting it filled that resulted in recurrence of back and neck stiffness and pan.  He is doing well with Humira and will therefore continue it.    - Continue Humira 40mg SQ every 14 days    Mr. Buck verbalized agreement with and understanding of the rational for the diagnosis and treatment plan.  All questions were answered to best of my ability and the patient's satisfaction. Mr. Buck was advised to contact the clinic with any questions that may arise after the clinic visit.      Thank you for involving me in the care of the patient    Return to clinic: 12 months, sooner if needed (based on patient preference)      HPI   Dayday Buck is a 27 year old male with a past medical history significant for ankylosing spondylitis who is seen for f/u of ankylosing spondylitis.    10/7/2016 rheumatology clinic note by Dr. Tammi Murrieta in [place], [place], documents HLA-B27 positive, sacroiliitis on MRI.  On Humira, naproxen PRN. Ankylosing Spondylitis.      2014 right hip MRI showed sacroiliitis    Today, Mr. Buck reports that he is doing great with Humira.  No side  effects.  No back pain except for on rare occasion where he takes an NSAID with resolution of the pain.  Happy with how well he is doing.     Denies fevers, chills, nausea, vomiting, constipation, diarrhea. No abdominal pain. No chest pain/pressure, palpitations, or shortness of breath. No LE swelling. No neck pain. No oral or nasal sores.  No sicca symptoms. No photosensitivity or photophobia. No eye pain or redness. No history of inflammatory eye disease.  No history of inflammatory bowel disease.    Denies FHx of autoimmune disease.      Tobacco: none  EtOH: 1 drink per week  Drugs: none  Occupation: ; [employer]    ROS   GEN: No fevers, chills, night sweats, or weight change  SKIN: See HPI  HEENT: No epistaxis. No oral or nasal ulcers.  CV: No chest pain, pressure, palpitations, or dyspnea on exertion.  PULM: No SOB, wheeze, cough.  GI: No nausea, vomiting, constipation, diarrhea. No blood in stool. No abdominal pain.  : No blood in urine.  MSK: See HPI.  NEURO: No numbness, tingling, or weakness.  EXT: No LE swelling  PSYCH: Negative    Active Problem List     Patient Active Problem List   Diagnosis     AS (ankylosing spondylitis) (H)     Acne vulgaris     Past Medical History     Past Medical History:   Diagnosis Date     Acne      AS (ankylosing spondylitis) (H)      Dyspepsia      Past Surgical History     Past Surgical History:   Procedure Laterality Date     HEAD & NECK SURGERY      wisdom tooth extraction     ORTHOPEDIC SURGERY      3 arthroscopic knee surgery for ACL repair     Allergy     Allergies   Allergen Reactions     No Known Allergies      Current Medication List     Current Outpatient Prescriptions   Medication Sig     adalimumab (HUMIRA PEN) 40 MG/0.8ML pen kit Inject 0.8 mLs (40 mg) Subcutaneous every 14 days     No current facility-administered medications for this visit.      Social History   See HPI    Family History     Family History   Problem Relation Age of Onset     Family  "History Negative Mother      CANCER Father      skin cancer basal cell     Family History Negative Brother      Family History Negative Sister      Family History Negative Sister      Breast Cancer Maternal Aunt      No FHx of rheumatologic disease    Physical Exam     Temp Readings from Last 3 Encounters:   04/10/18 97.8  F (36.6  C) (Oral)   03/24/17 98.6  F (37  C) (Oral)   03/10/17 98.2  F (36.8  C) (Oral)     BP Readings from Last 5 Encounters:   04/10/18 127/79   10/10/17 130/76   03/24/17 118/68   03/10/17 118/76   07/18/16 123/76     Pulse Readings from Last 1 Encounters:   04/10/18 63     Resp Readings from Last 1 Encounters:   04/10/18 16     Estimated body mass index is 27.7 kg/(m^2) as calculated from the following:    Height as of this encounter: 1.727 m (5' 8\").    Weight as of this encounter: 82.6 kg (182 lb 3.2 oz).    GEN: NAD  HEENT: MMM. Anicteric, noninjected sclera  CV: S1, S2. RRR. No m/r/g.  PULM: CTA bilaterally. No w/c.  MSK:  Hands, wrists, elbows, shoulders, knees, ankles, and MTPs without swelling or tenderness to palpation.  Spine nontender to palpation.  Able to touch toes from a standing position.  Heel and occiput to wall without difficulty.    NEURO: UE and LE strengths 5/5 and equal bilaterally.   SKIN: No rash.  EXT: No LE edema  PSYCH: Alert. Appropriate.    Labs / Imaging (select studies)     CBC  Recent Labs   Lab Test  10/10/17   1359  03/24/17   0953   WBC  7.0  6.3   RBC  4.70  4.68   HGB  14.5  14.2   HCT  40.3  40.6   MCV  86  87   RDW  11.8  11.9   PLT  238  216   MCH  30.9  30.3   MCHC  36.0  35.0   NEUTROPHIL  53.4   --    LYMPH  32.0   --    MONOCYTE  10.4   --    EOSINOPHIL  3.9   --    BASOPHIL  0.3   --    ANEU  3.7   --    ALYM  2.2   --    EPI  0.7   --    AEOS  0.3   --    ABAS  0.0   --      CMP  Recent Labs   Lab Test  10/10/17   1359  03/24/17   0953  03/08/17   1108   NA   --   139   --    POTASSIUM   --   4.1   --    CHLORIDE   --   104   --    CO2   --   29 "   --    ANIONGAP   --   6   --    GLC   --   80  86   BUN   --   13   --    CR  1.08  0.82   --    GFRESTIMATED  81  >90  Non  GFR Calc     --    GFRESTBLACK  >90  >90  African American GFR Calc     --    UZMA   --   8.7   --    BILITOTAL  0.6  0.5   --    ALBUMIN  4.3  4.3   --    PROTTOTAL  7.8  7.6   --    ALKPHOS  67  71   --    AST  31  24   --    ALT  47  29   --      Calcium/VitaminD  Recent Labs   Lab Test  03/24/17   0953   UZMA  8.7     ESR/CRP  Recent Labs   Lab Test  10/10/17   1359  03/24/17   0953   SED  6  7   CRP  <2.9   --      Tuberculosis Screening  Recent Labs   Lab Test  10/10/17   1359   TBRSLT  Negative   TBAGN  0.00     Immunization History     Immunization History   Administered Date(s) Administered     Influenza Vaccine IM 3yrs+ 4 Valent IIV4 10/10/2017     Pneumo Conj 13-V (2010&after) 01/21/2016     Pneumococcal 23 valent 10/10/2017     Tdap (Adacel,Boostrix) 04/27/2016          Chart documentation done in part with Dragon Voice recognition Software. Although reviewed after completion, some word and grammatical error may remain.    Bobby Middleton MD

## 2018-10-17 ENCOUNTER — MYC MEDICAL ADVICE (OUTPATIENT)
Dept: FAMILY MEDICINE | Facility: CLINIC | Age: 28
End: 2018-10-17

## 2018-10-17 NOTE — TELEPHONE ENCOUNTER
Patient/family was instructed to return call to [facility] RN directly on the RN Call back line at [phone].   Advised patient to call back to nurse line if further questions after reviewing PCP MyChart message.  If no further questions, go ahead and schedule appt with PCP.    Reynaldo Krueger RN

## 2018-10-17 NOTE — TELEPHONE ENCOUNTER
After Visit Summary   1/29/2018    Megan Young    MRN: [id number]           Patient Information     Date Of Birth          1982        Visit Information        Provider Department      1/29/2018 1:00 PM Gala Sparks APRN [facility]        Today's Diagnoses     Encounter for supervision of other normal pregnancy in third trimester    -  1    Elderly multigravida in second trimester           Follow-ups after your visit        Your next 10 appointments already scheduled     Feb 05, 2018  3:45 PM CST   [facility] with [facility]    [address] [id number] [phone]            Feb 12, 2018  3:45 PM CST   [facility] Establ[facility] Prenatal with CEM Salazar [facility] ([facility]    [address] [address] [phone]            Feb 19, 2018  3:45 PM CST   [facility] Establ[facility] Prenatal with CEM Salazar [facility]    [address] [address] [phone]              Who to contact     If you have questions or need follow up information about today's clinic visit or your schedule please contact [facility] directly at [phone].  Normal or non-critical lab and imaging results will be communicated to you by Tweetflowhart, letter or phone within 4 business days after the clinic has received the results. If you do not hear from us within 7 days, please contact the clinic through Tweetflowhart or phone. If you have a critical or abnormal lab result, we will notify you by phone as soon as possible.  Submit refill requests through [employer] or call your pharmacy and they will forward the refill request to us. Please allow 3 business days for your refill to be completed.          Additional  Because he hasn't had a visit in over a year and because these are both things that require medical decision making, he needs an appointment.     He can schedule a physical and we can address or he can set up a phone visit. Please call him.    GUMARO Rivero, PA-C    "Information About Your Visit        MyChart Information     [facility] gives you secure access to your electronic health record. If you see a primary care provider, you can also send messages to your care team and make appointments. If you have questions, please call your primary care clinic.  If you do not have a primary care provider, please call [phone] and they will assist you.        Care EveryWhere ID     This is your Care EveryWhere ID. This could be used by other organizations to access your [facility] medical records  [id number]        Your Vitals Were     Pulse Temperature Height Last Period BMI (Body Mass Index)       73 98  F (36.7  C) 5' 7\" (1.702 m) 05/21/2017 (Exact Date) 23.49 kg/m2        Blood Pressure from Last 3 Encounters:   01/29/18 111/73   01/17/18 104/64   01/03/18 108/62    Weight from Last 3 Encounters:   01/29/18 150 lb (68 kg)   01/17/18 147 lb 1.6 oz (66.7 kg)   01/03/18 145 lb 8 oz (66 kg)              We Performed the Following     Strep, Group B by PCR        Primary Care Provider Office Phone # Fax #    Alisia Chen [phone] [address] [place] [address]        Equal Access to Services     ARTHUR ROBLES : Hadii aad ku hadasho Soomaali, waaxda luqadaha, qaybta kaalmada adeegyada, domingo idiin haymassimon verna bravo . So [facility] [phone].    ATENCIÓN: Si habla español, tiene a luis disposición servicios gratuitos de asistencia lingüística. Llame al [phone].    We comply with applicable federal civil rights laws and [place] laws. We do not discriminate on the basis of race, color, national origin, age, disability, sex, sexual orientation, or gender identity.            Thank you!     Thank you for choosing [facility]  for your care. Our goal is always to provide you with excellent care. Hearing back from our patients is one way we can continue to improve our services. Please take a few minutes to complete the " written survey that you may receive in the mail after your visit with us. Thank you!             Your Updated Medication List - Protect others around you: Learn how to safely use, store and throw away your medicines at www.disposemymeds.org.          This list is accurate as of 1/29/18  1:51 PM.  Always use your most recent med list.                   Brand Name Dispense Instructions for use Diagnosis    prenatal multivitamin plus iron 27-0.8 MG Tabs per tablet      Take 1 tablet by mouth daily

## 2018-10-18 NOTE — TELEPHONE ENCOUNTER
Patient calls to schedule an appointment. He can be reached at [phone]. Flag for TC.    Tj Kilgore RN

## 2018-10-18 NOTE — TELEPHONE ENCOUNTER
Called patient at [phone], [facility] [place] to call back TC line, [phone]    Thanks!  Tj Camacho

## 2019-03-29 ASSESSMENT — ENCOUNTER SYMPTOMS
CHILLS: 0
PALPITATIONS: 0
HEARTBURN: 0
COUGH: 0
SHORTNESS OF BREATH: 0
MYALGIAS: 0
ABDOMINAL PAIN: 0
SORE THROAT: 0
DIARRHEA: 0
NERVOUS/ANXIOUS: 0
WEAKNESS: 0
JOINT SWELLING: 0
PARESTHESIAS: 0
DIZZINESS: 0
EYE PAIN: 0
CONSTIPATION: 0
FREQUENCY: 0
ARTHRALGIAS: 0
HEADACHES: 0
DYSURIA: 0
FEVER: 0
HEMATOCHEZIA: 0
NAUSEA: 0
HEMATURIA: 0

## 2019-04-01 ENCOUNTER — OFFICE VISIT (OUTPATIENT)
Dept: FAMILY MEDICINE | Facility: CLINIC | Age: 29
End: 2019-04-01
Payer: COMMERCIAL

## 2019-04-01 VITALS
WEIGHT: 189.8 LBS | HEIGHT: 68 IN | SYSTOLIC BLOOD PRESSURE: 116 MMHG | TEMPERATURE: 98.5 F | DIASTOLIC BLOOD PRESSURE: 80 MMHG | BODY MASS INDEX: 28.76 KG/M2 | HEART RATE: 60 BPM

## 2019-04-01 DIAGNOSIS — D23.9 BENIGN NEOPLASM OF SKIN, UNSPECIFIED LOCATION: ICD-10-CM

## 2019-04-01 DIAGNOSIS — Z00.00 ROUTINE GENERAL MEDICAL EXAMINATION AT A HEALTH CARE FACILITY: Primary | ICD-10-CM

## 2019-04-01 DIAGNOSIS — M45.8 ANKYLOSING SPONDYLITIS OF SACRAL REGION (H): ICD-10-CM

## 2019-04-01 DIAGNOSIS — Z31.41 FERTILITY TESTING: ICD-10-CM

## 2019-04-01 PROCEDURE — 99395 PREV VISIT EST AGE 18-39: CPT | Performed by: PHYSICIAN ASSISTANT

## 2019-04-01 ASSESSMENT — ENCOUNTER SYMPTOMS
FREQUENCY: 0
DIZZINESS: 0
DYSURIA: 0
NAUSEA: 0
HEARTBURN: 0
DIARRHEA: 0
WEAKNESS: 0
HEADACHES: 0
FEVER: 0
MYALGIAS: 0
EYE PAIN: 0
CONSTIPATION: 0
ARTHRALGIAS: 0
SHORTNESS OF BREATH: 0
PARESTHESIAS: 0
ABDOMINAL PAIN: 0
HEMATURIA: 0
CHILLS: 0
SORE THROAT: 0
COUGH: 0
HEMATOCHEZIA: 0
PALPITATIONS: 0
JOINT SWELLING: 0
NERVOUS/ANXIOUS: 0

## 2019-04-01 ASSESSMENT — MIFFLIN-ST. JEOR: SCORE: 1806.56

## 2019-04-01 NOTE — PROGRESS NOTES
SUBJECTIVE:   CC: Dayday Buck is an 28 year old male who presents for preventative health visit.     Physical   Annual:     Getting at least 3 servings of Calcium per day:  Yes    Bi-annual eye exam:  NO    Dental care twice a year:  NO    Sleep apnea or symptoms of sleep apnea:  None    Diet:  Regular (no restrictions)    Frequency of exercise:  2-3 days/week    Duration of exercise:  15-30 minutes    Taking medications regularly:  Yes    Medication side effects:  None    Additional concerns today:  Yes    PHQ-2 Total Score: 0    Patient wanted to get dermatology referral:   Get fertility testing:    Today's PHQ-2 Score:   PHQ-2 ( 1999 Pf[employer]) 3/29/2019   Q1: Little interest or pleasure in doing things 0   Q2: Feeling down, depressed or hopeless 0   PHQ-2 Score 0   Q1: Little interest or pleasure in doing things Not at all   Q2: Feeling down, depressed or hopeless Not at all   PHQ-2 Score 0       Abuse: Current or Past(Physical, Sexual or Emotional)- No  Do you feel safe in your environment? Yes    Social History     Tobacco Use     Smoking status: Never Smoker     Smokeless tobacco: Never Used   Substance Use Topics     Alcohol use: Yes     Alcohol/week: 0.0 oz     Comment: 1 drinks a week     Alcohol Use 3/29/2019   If you drink alcohol do you typically have greater than 3 drinks per day OR greater than 7 drinks per week? No   No flowsheet data found.    Last PSA: No results found for: PSA    Reviewed orders with patient. Reviewed health maintenance and updated orders accordingly - Yes  Labs reviewed in EPIC    Reviewed and updated as needed this visit by clinical staff  Tobacco  Allergies  Meds  Med Hx  Surg Hx  Fam Hx  Soc Hx      Reviewed and updated as needed this visit by Provider          Review of Systems   Constitutional: Negative for chills and fever.   HENT: Negative for congestion, ear pain, hearing loss and sore throat.    Eyes: Negative for pain and visual disturbance.   Respiratory: Negative  "for cough and shortness of breath.    Cardiovascular: Negative for chest pain, palpitations and peripheral edema.   Gastrointestinal: Negative for abdominal pain, constipation, diarrhea, heartburn, hematochezia and nausea.   Genitourinary: Negative for discharge, dysuria, frequency, genital sores, hematuria, impotence and urgency.   Musculoskeletal: Negative for arthralgias, joint swelling and myalgias.   Skin: Negative for rash.   Neurological: Negative for dizziness, weakness, headaches and paresthesias.   Psychiatric/Behavioral: Negative for mood changes. The patient is not nervous/anxious.        OBJECTIVE:   /80 (BP Location: Right arm, Patient Position: Chair, Cuff Size: Adult Regular)   Pulse 60   Temp 98.5  F (36.9  C) (Oral)   Ht 1.737 m (5' 8.39\")   Wt 86.1 kg (189 lb 12.8 oz)   BMI 28.53 kg/m      Physical Exam  GENERAL: healthy, alert and no distress  EYES: Eyes grossly normal to inspection, PERRL and conjunctivae and sclerae normal  HENT: ear canals and TM's normal, nose and mouth without ulcers or lesions  NECK: no adenopathy, no asymmetry, masses, or scars and thyroid normal to palpation  RESP: lungs clear to auscultation - no rales, rhonchi or wheezes  CV: regular rate and rhythm, normal S1 S2, no S3 or S4, no murmur, click or rub, no peripheral edema and peripheral pulses strong  ABDOMEN: soft, nontender, no hepatosplenomegaly, no masses and bowel sounds normal  MS: no gross musculoskeletal defects noted, no edema  SKIN: no suspicious lesions or rashes  NEURO: Normal strength and tone, mentation intact and speech normal  PSYCH: mentation appears normal, affect normal/bright  LYMPH: no cervical, supraclavicular, axillary, or inguinal adenopathy    Diagnostic Test Results:  none     ASSESSMENT/PLAN:   (Z00.00) Routine general medical examination at a health care facility  (primary encounter diagnosis)  Comment: Well person   Plan: Diet, exercise, wellness and other preventive " "recommendations related to health maintenance were discussed.  Follow up as needed for acute issues.  Physical exam in 1 year.     (M45.8) Ankylosing spondylitis of sacral region (H)  Comment:   Plan: RHEUMATOLOGY REFERRAL        Managed by Dr. Middleton    (D23.9) Benign neoplasm of skin, unspecified location  Comment:   Plan: DERMATOLOGY REFERRAL        A few moles. None of which are concerning. Refer to Derm per request     (Z31.41) Fertility testing  Comment:   Plan: Semen Analysis, Strict Morphology (YANN)        Trying to get pregnant x 1 year. Check Semen analysis.       COUNSELING:   Reviewed preventive health counseling, as reflected in patient instructions    BP Readings from Last 1 Encounters:   04/01/19 116/80     Estimated body mass index is 28.53 kg/m  as calculated from the following:    Height as of this encounter: 1.737 m (5' 8.39\").    Weight as of this encounter: 86.1 kg (189 lb 12.8 oz).     reports that  has never smoked. he has never used smokeless tobacco.      Counseling Resources:  ATP IV Guidelines  Pooled Cohorts Equation Calculator  FRAX Risk Assessment  ICSI Preventive Guidelines  Dietary Guidelines for Americans, 2010  USDA's MyPlate  ASA Prophylaxis  Lung CA Screening    FABIENNE ALEGRE PA-C  [facility]  "

## 2019-04-09 ENCOUNTER — OFFICE VISIT (OUTPATIENT)
Dept: RHEUMATOLOGY | Facility: CLINIC | Age: 29
End: 2019-04-09
Payer: COMMERCIAL

## 2019-04-09 ENCOUNTER — TELEPHONE (OUTPATIENT)
Dept: RHEUMATOLOGY | Facility: CLINIC | Age: 29
End: 2019-04-09

## 2019-04-09 VITALS
TEMPERATURE: 98.7 F | DIASTOLIC BLOOD PRESSURE: 82 MMHG | HEIGHT: 68 IN | HEART RATE: 54 BPM | OXYGEN SATURATION: 97 % | WEIGHT: 186.6 LBS | RESPIRATION RATE: 16 BRPM | BODY MASS INDEX: 28.28 KG/M2 | SYSTOLIC BLOOD PRESSURE: 137 MMHG

## 2019-04-09 DIAGNOSIS — Z79.899 HIGH RISK MEDICATION USE: ICD-10-CM

## 2019-04-09 DIAGNOSIS — M45.7 ANKYLOSING SPONDYLITIS OF LUMBOSACRAL REGION (H): Primary | ICD-10-CM

## 2019-04-09 LAB
ALBUMIN SERPL-MCNC: 4.6 G/DL (ref 3.4–5)
ALP SERPL-CCNC: 64 U/L (ref 40–150)
ALT SERPL W P-5'-P-CCNC: 31 U/L (ref 0–70)
AST SERPL W P-5'-P-CCNC: 23 U/L (ref 0–45)
BASOPHILS # BLD AUTO: 0 10E9/L (ref 0–0.2)
BASOPHILS NFR BLD AUTO: 0.3 %
BILIRUB DIRECT SERPL-MCNC: 0.2 MG/DL (ref 0–0.2)
BILIRUB SERPL-MCNC: 0.6 MG/DL (ref 0.2–1.3)
CREAT SERPL-MCNC: 1.04 MG/DL (ref 0.66–1.25)
CRP SERPL-MCNC: <2.9 MG/L (ref 0–8)
DIFFERENTIAL METHOD BLD: NORMAL
EOSINOPHIL # BLD AUTO: 0.3 10E9/L (ref 0–0.7)
EOSINOPHIL NFR BLD AUTO: 4 %
ERYTHROCYTE [DISTWIDTH] IN BLOOD BY AUTOMATED COUNT: 11.7 % (ref 10–15)
ERYTHROCYTE [SEDIMENTATION RATE] IN BLOOD BY WESTERGREN METHOD: 7 MM/H (ref 0–15)
GFR SERPL CREATININE-BSD FRML MDRD: >90 ML/MIN/{1.73_M2}
HCT VFR BLD AUTO: 41.9 % (ref 40–53)
HGB BLD-MCNC: 15.1 G/DL (ref 13.3–17.7)
LYMPHOCYTES # BLD AUTO: 2.3 10E9/L (ref 0.8–5.3)
LYMPHOCYTES NFR BLD AUTO: 32 %
MCH RBC QN AUTO: 30.9 PG (ref 26.5–33)
MCHC RBC AUTO-ENTMCNC: 36 G/DL (ref 31.5–36.5)
MCV RBC AUTO: 86 FL (ref 78–100)
MONOCYTES # BLD AUTO: 0.7 10E9/L (ref 0–1.3)
MONOCYTES NFR BLD AUTO: 10 %
NEUTROPHILS # BLD AUTO: 3.9 10E9/L (ref 1.6–8.3)
NEUTROPHILS NFR BLD AUTO: 53.7 %
PLATELET # BLD AUTO: 258 10E9/L (ref 150–450)
PROT SERPL-MCNC: 8.1 G/DL (ref 6.8–8.8)
RBC # BLD AUTO: 4.89 10E12/L (ref 4.4–5.9)
WBC # BLD AUTO: 7.2 10E9/L (ref 4–11)

## 2019-04-09 PROCEDURE — 36415 COLL VENOUS BLD VENIPUNCTURE: CPT | Performed by: INTERNAL MEDICINE

## 2019-04-09 PROCEDURE — 82565 ASSAY OF CREATININE: CPT | Performed by: INTERNAL MEDICINE

## 2019-04-09 PROCEDURE — 85652 RBC SED RATE AUTOMATED: CPT | Performed by: INTERNAL MEDICINE

## 2019-04-09 PROCEDURE — 80076 HEPATIC FUNCTION PANEL: CPT | Performed by: INTERNAL MEDICINE

## 2019-04-09 PROCEDURE — 86140 C-REACTIVE PROTEIN: CPT | Performed by: INTERNAL MEDICINE

## 2019-04-09 PROCEDURE — 99213 OFFICE O/P EST LOW 20 MIN: CPT | Performed by: INTERNAL MEDICINE

## 2019-04-09 PROCEDURE — 85025 COMPLETE CBC W/AUTO DIFF WBC: CPT | Performed by: INTERNAL MEDICINE

## 2019-04-09 RX ORDER — NAPROXEN 250 MG/1
250-500 TABLET ORAL 2 TIMES DAILY PRN
Qty: 180 TABLET | Refills: 1 | Status: SHIPPED | OUTPATIENT
Start: 2019-04-09 | End: 2020-01-29

## 2019-04-09 ASSESSMENT — MIFFLIN-ST. JEOR: SCORE: 1785.91

## 2019-04-09 ASSESSMENT — PAIN SCALES - GENERAL: PAINLEVEL: MILD PAIN (3)

## 2019-04-09 NOTE — PROGRESS NOTES
Rheumatology Clinic Visit      Dayday Buck MRN# [id number]   YOB: 1990 Age: 28 year old      Date of visit: 4/09/19   Referring provider: Lamin Zuniga  PCP: Lamin Zuniga    Chief Complaint   Patient presents with:  RECHECK: 1 year f/u ankylosing spondylitis. Patient states he is doing alright. He has not been on Humira since December of 2018. Last shot was end of 11/2018-early 12/2018. Took naproxen for the next couple months. He is currently taking 3 naproxen/aleve daily. Helps temporarily.       Assessment and Plan     1. Ankylosing Spondylitis: Diagnosed in 2014 by Dr. Tammi Murrieta, rheumatologist in [place], [place].  8/22/2014 [facility] MRI of the right hip showed sacroiliitis.  He has been on Humira since 2014 with resolution of his initial symptoms that included pain and stiffness of his bilateral hips, lower back, and neck; Humira not used now for 4 months and he is requiring naproxen on a more regular basis.  Off Humira because he lost his job and insurance; now has new insurance.   - Restart Humira 40mg SQ every 14 days  - Naproxen 250-500mg BID PRN; advised using the lowest effective dose    # NSAIDs:  The risks and benefits of NSAIDs were discussed in detail and the patient verbalized understanding.  The risks discussed include, but are not limited to, the risk for hypersensitivity, anaphylaxis, GI upset, gastric ulcer, nephropathy, and an increased risk for cardiovascular events.     2.  Vaccinations: Vaccinations reviewed with Mr. Buck.  Risks and benefits of vaccinations were discussed.   CDC stance on shingrix when on moderate to high immunosuppression was reviewed.   I explained that Shingrix is used off label when under 50 years old and that the safety and efficacy of the vaccine has not been tested in people younger than 50 years old.   - Influenza: encouraged yearly vaccination  - [id number]: up to date  - [id number]: up to date  - Shingrix: Advised receiving;  patient is going to check on insurance coverage before determining if it is going to be received    Mr. Buck verbalized agreement with and understanding of the rational for the diagnosis and treatment plan.  All questions were answered to best of my ability and the patient's satisfaction. Mr. Buck was advised to contact the clinic with any questions that may arise after the clinic visit.      Thank you for involving me in the care of the patient    Return to clinic: 6 months      HPI   Dayday Buck is a 28 year old male with a past medical history significant for ankylosing spondylitis who is seen for f/u of ankylosing spondylitis.    10/7/2016 rheumatology clinic note by Dr. Tammi Murrieta in [place], [place], documents HLA-B27 positive, sacroiliitis on MRI.  On Humira, naproxen PRN. Ankylosing Spondylitis.      2014 right hip MRI showed sacroiliitis    Today, Mr. Buck reports that he was doing great on Humira but he lost his job and insurance in December 2018 so has been off Humira since then.  He did well for about 2 months but then on month 2-4 he has noticed an increased use of naproxen.  He now uses naproxen 220-440 mg twice daily that is helpful for his lower back pain and stiffness.  Morning stiffness for about 1 hour that improves with activity and time and NSAIDs.  He would like to restart Humira.      Denies fevers, chills, nausea, vomiting, constipation, diarrhea. No abdominal pain. No chest pain/pressure, palpitations, or shortness of breath. No LE swelling. No neck pain. No oral or nasal sores.  No sicca symptoms. No photosensitivity or photophobia. No eye pain or redness. No history of inflammatory eye disease.  No history of inflammatory bowel disease.    Denies FHx of autoimmune disease.      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 No oral or nasal ulcers.  CV: No chest pain, pressure, palpitations, or dyspnea on exertion.  PULM: No SOB, wheeze, cough.  GI: No nausea, vomiting, constipation, diarrhea. No blood in stool. No abdominal pain.  : No blood in urine.  MSK: See HPI.  NEURO: No numbness, tingling, or weakness.  EXT: No LE swelling  PSYCH: Negative    Active Problem List     Patient Active Problem List   Diagnosis     AS (ankylosing spondylitis) (H)     Acne vulgaris     Past Medical History     Past Medical History:   Diagnosis Date     Acne      AS (ankylosing spondylitis) (H)      Dyspepsia      Past Surgical History     Past Surgical History:   Procedure Laterality Date     HEAD & NECK SURGERY      wisdom tooth extraction     ORTHOPEDIC SURGERY      3 arthroscopic knee surgery for ACL repair     Allergy     Allergies   Allergen Reactions     No Known Allergies      Current Medication List     Current Outpatient Medications   Medication Sig     naproxen (NAPROSYN) 375 MG tablet TAKE ONE TABLET BY MOUTH TWICE A DAY (WITH MEALS).     adalimumab (HUMIRA PEN) 40 MG/0.8ML pen kit Inject 0.8 mLs (40 mg) Subcutaneous every 14 days (Patient not taking: Reported on 4/1/2019)     No current facility-administered medications for this visit.      Social History   See HPI    Family History     Family History   Problem Relation Age of Onset     Family History Negative Mother      Cancer Father         skin cancer basal cell     Family History Negative Brother      Family History Negative Sister      Family History Negative Sister      Breast Cancer Maternal Aunt      No FHx of rheumatologic disease    Physical Exam     Temp Readings from Last 3 Encounters:   04/09/19 98.7  F (37.1  C) (Oral)   04/01/19 98.5  F (36.9  C) (Oral)   04/10/18 97.8  F (36.6  C) (Oral)     BP Readings from Last 5 Encounters:   04/09/19 137/82   04/01/19 116/80   04/10/18 127/79   10/10/17 130/76   03/24/17 118/68     Pulse Readings from  "Last 1 Encounters:   04/09/19 54     Resp Readings from Last 1 Encounters:   04/09/19 16     Estimated body mass index is 28.37 kg/m  as calculated from the following:    Height as of this encounter: 1.727 m (5' 8\").    Weight as of this encounter: 84.6 kg (186 lb 9.6 oz).    GEN: NAD  HEENT: MMM. Anicteric, noninjected sclera  CV: S1, S2. RRR. No m/r/g.  PULM: CTA bilaterally. No w/c.  MSK:  Hands, wrists, elbows, shoulders, knees, ankles, and MTPs without swelling or tenderness to palpation.  Spine nontender to palpation.  Able to touch toes from a standing position.  Heel and occiput to wall without difficulty.    NEURO: UE and LE strengths 5/5 and equal bilaterally.   SKIN: No rash. No nail pitting.  EXT: No LE edema  PSYCH: Alert. Appropriate.    Labs / Imaging (select studies)     CBC  Recent Labs   Lab Test 10/10/17  1359 03/24/17  0953   WBC 7.0 6.3   RBC 4.70 4.68   HGB 14.5 14.2   HCT 40.3 40.6   MCV 86 87   RDW 11.8 11.9    216   MCH 30.9 30.3   MCHC 36.0 35.0   NEUTROPHIL 53.4  --    LYMPH 32.0  --    MONOCYTE 10.4  --    EOSINOPHIL 3.9  --    BASOPHIL 0.3  --    ANEU 3.7  --    ALYM 2.2  --    EPI 0.7  --    AEOS 0.3  --    ABAS 0.0  --      CMP  Recent Labs   Lab Test 10/10/17  1359 03/24/17  0953 03/08/17  1108   NA  --  139  --    POTASSIUM  --  4.1  --    CHLORIDE  --  104  --    CO2  --  29  --    ANIONGAP  --  6  --    GLC  --  80 86   BUN  --  13  --    CR 1.08 0.82  --    GFRESTIMATED 81 >90  Non  GFR Calc    --    GFRESTBLACK >90 >90  African American GFR Calc    --    UZMA  --  8.7  --    BILITOTAL 0.6 0.5  --    ALBUMIN 4.3 4.3  --    PROTTOTAL 7.8 7.6  --    ALKPHOS 67 71  --    AST 31 24  --    ALT 47 29  --      Calcium/VitaminD  Recent Labs   Lab Test 03/24/17  0953   UZMA 8.7     ESR/CRP  Recent Labs   Lab Test 10/10/17  1359 03/24/17  0953   SED 6 7   CRP <2.9  --      Tuberculosis Screening  Recent Labs   Lab Test 10/10/17  1359   TBRSLT Negative   TBAGN 0.00 "     Immunization History     Immunization History   Administered Date(s) Administered     Influenza Vaccine IM 3yrs+ 4 Valent IIV4 10/10/2017     Pneumo Conj 13-V (2010&after) 01/21/2016     Pneumococcal 23 valent 10/10/2017     Tdap (Adacel,Boostrix) 04/27/2016          Chart documentation done in part with Dragon Voice recognition Software. Although reviewed after completion, some word and grammatical error may remain.    Bobby Middleton MD

## 2019-04-09 NOTE — TELEPHONE ENCOUNTER
PA Initiation    Medication: humira pa pending   Insurance Company: [employer] [place] - Phone [phone] Fax [phone]  Pharmacy Filling the Rx:    Filling Pharmacy Phone:    Filling Pharmacy Fax:    Start Date: 4/9/2019

## 2019-04-09 NOTE — PATIENT INSTRUCTIONS
Rheumatology    Dr. Bobby Middleton         [facility]   (Monday)  [address]  [place] [address]       [facility]   (Tuesday)  [address]  [place] [address]    [facility]   (Wed., Thurs., and Friday)  [address] [place] [address]    Phone number: [phone]  Thank you for choosing [facility].  Kassidy Dave CMA

## 2019-04-26 DIAGNOSIS — Z31.41 FERTILITY TESTING: ICD-10-CM

## 2019-04-26 PROCEDURE — 89322 SEMEN ANAL STRICT CRITERIA: CPT

## 2019-04-30 LAB
ABNORMAL SPERM: 99 MORPHOLOGY
ABSTINENCE DAYS: 5 DAYS (ref 2–7)
AGGLUTINATION: NO YES/NO
ANALYSIS TEMP - CENTIGRADE: 23 CENTIGRADE
CELL FRAGMENTS: ABNORMAL %
COLLECTION METHOD: ABNORMAL
COLLECTION SITE: ABNORMAL
CONSENT TO RELEASE TO PARTNER: NO
HEAD DEFECT: 99
IMMATURE SPERM: ABNORMAL %
IMMOTILE: 34 %
LAB RECEIPT TIME: ABNORMAL
LIQUEFIED: YES YES/NO
MIDPIECE DEFECT: 45
NON-PROGRESSIVE MOTILITY: 6 %
NORMAL SPERM: 1 % NORMAL FORMS (ref 4–?)
PROGRESSIVE MOTILITY: 60 % (ref 32–?)
ROUND CELLS: 0.2 MILLION/ML (ref ?–2)
SPECIMEN CONCENTRATION: 41 MILLION/ML (ref 15–?)
SPECIMEN PH: 7.6 PH (ref 7.2–?)
SPECIMEN TYPE: ABNORMAL
SPECIMEN VOL UR: 4.5 ML (ref 1.5–?)
TAIL DEFECT: 16
TIME OF ANALYSIS: ABNORMAL
TOTAL NUMBER: 185 MILLION (ref 39–?)
TOTAL PROGRESSIVE MOTILE: 111 MILLION (ref 15.6–?)
VISCOUS: NO YES/NO
VITALITY: ABNORMAL % (ref 58–?)
WBC SPECIMEN: ABNORMAL %

## 2019-05-02 ENCOUNTER — MYC MEDICAL ADVICE (OUTPATIENT)
Dept: FAMILY MEDICINE | Facility: CLINIC | Age: 29
End: 2019-05-02

## 2019-05-14 NOTE — TELEPHONE ENCOUNTER
Message read in my chart by patient will close for now.  Earlene Jiménez,Clinic Rn  [facility]

## 2019-09-20 ENCOUNTER — MYC MEDICAL ADVICE (OUTPATIENT)
Dept: FAMILY MEDICINE | Facility: CLINIC | Age: 29
End: 2019-09-20

## 2019-09-20 DIAGNOSIS — Z31.41 FERTILITY TESTING: Primary | ICD-10-CM

## 2019-09-20 NOTE — TELEPHONE ENCOUNTER
"Routing [employer] message to provider to please advise. Ok to order a second semen analysis or should patient follow up with an office visit? Order pended.     Patient last saw PCP in clinic on 4/1/19 for this, note pasted below.     Jes Gama RN    \"(Z31.41) Fertility testing  Comment:   Plan: Semen Analysis, Strict Morphology (YANN)        Trying to get pregnant x 1 year. Check Semen analysis.\"  "

## 2020-01-24 DIAGNOSIS — M45.7 ANKYLOSING SPONDYLITIS OF LUMBOSACRAL REGION (H): ICD-10-CM

## 2020-01-29 ENCOUNTER — OFFICE VISIT (OUTPATIENT)
Dept: RHEUMATOLOGY | Facility: CLINIC | Age: 30
End: 2020-01-29
Payer: COMMERCIAL

## 2020-01-29 VITALS
HEART RATE: 60 BPM | DIASTOLIC BLOOD PRESSURE: 75 MMHG | SYSTOLIC BLOOD PRESSURE: 137 MMHG | WEIGHT: 181.2 LBS | OXYGEN SATURATION: 97 % | BODY MASS INDEX: 27.46 KG/M2 | HEIGHT: 68 IN

## 2020-01-29 DIAGNOSIS — M45.7 ANKYLOSING SPONDYLITIS OF LUMBOSACRAL REGION (H): Primary | ICD-10-CM

## 2020-01-29 DIAGNOSIS — Z23 NEED FOR PROPHYLACTIC VACCINATION AND INOCULATION AGAINST INFLUENZA: ICD-10-CM

## 2020-01-29 LAB
ALBUMIN SERPL-MCNC: 4.5 G/DL (ref 3.4–5)
ALP SERPL-CCNC: 54 U/L (ref 40–150)
ALT SERPL W P-5'-P-CCNC: 24 U/L (ref 0–70)
AST SERPL W P-5'-P-CCNC: 22 U/L (ref 0–45)
BASOPHILS # BLD AUTO: 0 10E9/L (ref 0–0.2)
BASOPHILS NFR BLD AUTO: 0.6 %
BILIRUB DIRECT SERPL-MCNC: 0.1 MG/DL (ref 0–0.2)
BILIRUB SERPL-MCNC: 0.4 MG/DL (ref 0.2–1.3)
CREAT SERPL-MCNC: 0.84 MG/DL (ref 0.66–1.25)
CRP SERPL-MCNC: <2.9 MG/L (ref 0–8)
DIFFERENTIAL METHOD BLD: NORMAL
EOSINOPHIL # BLD AUTO: 0.4 10E9/L (ref 0–0.7)
EOSINOPHIL NFR BLD AUTO: 5.3 %
ERYTHROCYTE [DISTWIDTH] IN BLOOD BY AUTOMATED COUNT: 11.9 % (ref 10–15)
ERYTHROCYTE [SEDIMENTATION RATE] IN BLOOD BY WESTERGREN METHOD: 5 MM/H (ref 0–15)
GFR SERPL CREATININE-BSD FRML MDRD: >90 ML/MIN/{1.73_M2}
HCT VFR BLD AUTO: 40 % (ref 40–53)
HGB BLD-MCNC: 14.1 G/DL (ref 13.3–17.7)
LYMPHOCYTES # BLD AUTO: 2.5 10E9/L (ref 0.8–5.3)
LYMPHOCYTES NFR BLD AUTO: 38.1 %
MCH RBC QN AUTO: 30.3 PG (ref 26.5–33)
MCHC RBC AUTO-ENTMCNC: 35.3 G/DL (ref 31.5–36.5)
MCV RBC AUTO: 86 FL (ref 78–100)
MONOCYTES # BLD AUTO: 0.7 10E9/L (ref 0–1.3)
MONOCYTES NFR BLD AUTO: 10 %
NEUTROPHILS # BLD AUTO: 3 10E9/L (ref 1.6–8.3)
NEUTROPHILS NFR BLD AUTO: 46 %
PLATELET # BLD AUTO: 235 10E9/L (ref 150–450)
PROT SERPL-MCNC: 7.8 G/DL (ref 6.8–8.8)
RBC # BLD AUTO: 4.65 10E12/L (ref 4.4–5.9)
WBC # BLD AUTO: 6.6 10E9/L (ref 4–11)

## 2020-01-29 PROCEDURE — 36415 COLL VENOUS BLD VENIPUNCTURE: CPT | Performed by: INTERNAL MEDICINE

## 2020-01-29 PROCEDURE — 85652 RBC SED RATE AUTOMATED: CPT | Performed by: INTERNAL MEDICINE

## 2020-01-29 PROCEDURE — 90686 IIV4 VACC NO PRSV 0.5 ML IM: CPT | Performed by: INTERNAL MEDICINE

## 2020-01-29 PROCEDURE — 85025 COMPLETE CBC W/AUTO DIFF WBC: CPT | Performed by: INTERNAL MEDICINE

## 2020-01-29 PROCEDURE — 82565 ASSAY OF CREATININE: CPT | Performed by: INTERNAL MEDICINE

## 2020-01-29 PROCEDURE — 86140 C-REACTIVE PROTEIN: CPT | Performed by: INTERNAL MEDICINE

## 2020-01-29 PROCEDURE — 86481 TB AG RESPONSE T-CELL SUSP: CPT | Performed by: INTERNAL MEDICINE

## 2020-01-29 PROCEDURE — 99213 OFFICE O/P EST LOW 20 MIN: CPT | Mod: 25 | Performed by: INTERNAL MEDICINE

## 2020-01-29 PROCEDURE — 90471 IMMUNIZATION ADMIN: CPT | Performed by: INTERNAL MEDICINE

## 2020-01-29 PROCEDURE — 80076 HEPATIC FUNCTION PANEL: CPT | Performed by: INTERNAL MEDICINE

## 2020-01-29 RX ORDER — NAPROXEN 250 MG/1
250-500 TABLET ORAL 2 TIMES DAILY PRN
Qty: 180 TABLET | Refills: 1 | Status: SHIPPED | OUTPATIENT
Start: 2020-01-29 | End: 2020-07-29

## 2020-01-29 ASSESSMENT — MIFFLIN-ST. JEOR: SCORE: 1756.42

## 2020-01-29 NOTE — PATIENT INSTRUCTIONS
Rheumatology    Dr. Bobby Middleton       [facility] [place]   (Monday)  [address]  [place] [address]       [facility] [place]   (Tuesday)  [address] [place] [address]    [facility] in [place]   (Wed., Thurs., and Friday)  [address] [place] [address]    Phone number: [phone]  Thank you for choosing [facility].  Kassidy Dave CMA

## 2020-01-29 NOTE — PROGRESS NOTES
Rheumatology Clinic Visit      Dayday Buck MRN# [id number]   YOB: 1990 Age: 29 year old      Date of visit: 1/29/20   PCP: Lamin Zuniga    Chief Complaint   Patient presents with:  Ankylosing Spondylitis: Patient is bad with ordering his humira. Has been achy and having pain.     Assessment and Plan     1. Ankylosing Spondylitis: Diagnosed in 2014 by Dr. Tammi Murrieta, rheumatologist in [place], [place].  8/22/2014 [facility] MRI of the right hip showed sacroiliitis.  He has been on Humira since 2014 with resolution of his initial symptoms that included pain and stiffness of his bilateral hips, lower back, and neck; Humira not used now for 4 months and he is requiring naproxen on a more regular basis; but when last seen in April 2019 he had insurance again so was going to restart but hasn't been consistent with q2wk dosing and says that he can tell when 1-2 weeks late.  Encouraged taking Humira every 14 days and he says that between reminders on his phone and from his wife he will improve.    - Continue Humira 40mg SQ every 14 days   - Naproxen 250-500mg BID PRN; advised using the lowest effective dose  - Labs today and in 6 months: CBC, Creatinine, Hepatic Panel, ESR, CRP    2.  Vaccinations: Vaccinations reviewed with Mr. Buck.  Risks and benefits of vaccinations were discussed.   CDC stance on shingrix when on moderate to high immunosuppression was reviewed.   I explained that Shingrix is used off label when under 50 years old and that the safety and efficacy of the vaccine has not been tested in people younger than 50 years old.   - Influenza: will receive today  - [id number]: up to date  - [id number]: up to date  - Shingrix: Advised receiving; patient is going to check on insurance coverage before determining if it is going to be received    Mr. Buck verbalized agreement with and understanding of the rational for the diagnosis and treatment plan.  All questions were answered to  best of my ability and the patient's satisfaction. Mr. Buck was advised to contact the clinic with any questions that may arise after the clinic visit.      Thank you for involving me in the care of the patient    Return to clinic: 6 months      HPI   Dayday Buck is a 29 year old male with a past medical history significant for ankylosing spondylitis who is seen for f/u of ankylosing spondylitis.    10/7/2016 rheumatology clinic note by Dr. Tammi Murrieta in [place], [place], documents HLA-B27 positive, sacroiliitis on MRI.  On Humira, naproxen PRN. Ankylosing Spondylitis.      2014 right hip MRI showed sacroiliitis    4/9/2019: Mr. Buck reported that he was doing great on Humira but he lost his job and insurance in December 2018 so has been off Humira since then.  He did well for about 2 months but then on month 2-4 he has noticed an increased use of naproxen.  He now uses naproxen 220-440 mg twice daily that is helpful for his lower back pain and stiffness.  Morning stiffness for about 1 hour that improves with activity and time and NSAIDs.  He would like to restart Humira.      10/8/2019: no showed to scheduled clinic visit    Today, 1/29/2020: doing well when taking humira every 2 weeks, but often late by 1-2 weeks and symptoms return when late.  Mostly back issues when more symptomatic, and morning stiffness for about 1 hour. Says that he will be better about taking Humira with the help from phone reminders and his wife.     Denies fevers, chills, nausea, vomiting, constipation, diarrhea. No abdominal pain. No chest pain/pressure, palpitations, or shortness of breath. No LE swelling. No neck pain. No oral or nasal sores.  No sicca symptoms. No photosensitivity or photophobia. No eye pain or redness. No history of inflammatory eye disease.  No history of inflammatory bowel disease.    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 No oral or nasal ulcers.  CV: No chest pain, pressure, palpitations, or dyspnea on exertion.  PULM: No SOB, wheeze, cough.  GI: No nausea, vomiting, constipation, diarrhea. No blood in stool. No abdominal pain.  : No blood in urine.  MSK: See HPI.  NEURO: No numbness, tingling, or weakness.  EXT: No LE swelling  PSYCH: Negative    Active Problem List     Patient Active Problem List   Diagnosis     AS (ankylosing spondylitis) (H)     Acne vulgaris     Past Medical History     Past Medical History:   Diagnosis Date     Acne      AS (ankylosing spondylitis) (H)      Dyspepsia      Past Surgical History     Past Surgical History:   Procedure Laterality Date     HEAD & NECK SURGERY      wisdom tooth extraction     ORTHOPEDIC SURGERY      3 arthroscopic knee surgery for ACL repair     Allergy     Allergies   Allergen Reactions     No Known Allergies      Current Medication List     Current Outpatient Medications   Medication Sig     adalimumab (HUMIRA *CF*) 40 MG/0.4ML prefilled syringe kit Inject 0.4 mLs (40 mg) Subcutaneous every 14 days . Hold for signs of infection, then seek medical attention.     naproxen (NAPROSYN) 250 MG tablet Take 1-2 tablets (250-500 mg) by mouth 2 times daily as needed for moderate pain     No current facility-administered medications for this visit.      Social History   See HPI    Family History     Family History   Problem Relation Age of Onset     Family History Negative Mother      Cancer Father         skin cancer basal cell     Family History Negative Brother      Family History Negative Sister      Family History Negative Sister      Breast Cancer Maternal Aunt      No FHx of rheumatologic disease    Physical Exam     Temp Readings from Last 3 Encounters:   04/09/19 98.7  F (37.1  C) (Oral)   04/01/19 98.5  F (36.9  C) (Oral)   04/10/18 97.8  F (36.6  C) (Oral)     BP Readings  "from Last 5 Encounters:   04/09/19 137/82   04/01/19 116/80   04/10/18 127/79   10/10/17 130/76   03/24/17 118/68     Pulse Readings from Last 1 Encounters:   04/09/19 54     Resp Readings from Last 1 Encounters:   04/09/19 16     Estimated body mass index is 28.37 kg/m  as calculated from the following:    Height as of this encounter: 1.727 m (5' 8\").    Weight as of 4/9/19: 84.6 kg (186 lb 9.6 oz).    GEN: NAD  HEENT: MMM. Anicteric, noninjected sclera  CV: S1, S2. RRR. No m/r/g.  PULM: CTA bilaterally. No w/c.  MSK:  MCPs, PIPs, DIPs, wrists, elbows, shoulders, knees, ankles, and MTPs without swelling or tenderness to palpation.  Spine nontender to palpation.    NEURO: UE and LE strengths 5/5 and equal bilaterally.   SKIN: No rash. No nail pitting.  EXT: No LE edema  PSYCH: Alert. Appropriate.    Labs / Imaging (select studies)     CBC  Recent Labs   Lab Test 04/09/19  1328 10/10/17  1359 03/24/17  0953   WBC 7.2 7.0 6.3   RBC 4.89 4.70 4.68   HGB 15.1 14.5 14.2   HCT 41.9 40.3 40.6   MCV 86 86 87   RDW 11.7 11.8 11.9    238 216   MCH 30.9 30.9 30.3   MCHC 36.0 36.0 35.0   NEUTROPHIL 53.7 53.4  --    LYMPH 32.0 32.0  --    MONOCYTE 10.0 10.4  --    EOSINOPHIL 4.0 3.9  --    BASOPHIL 0.3 0.3  --    ANEU 3.9 3.7  --    ALYM 2.3 2.2  --    EPI 0.7 0.7  --    AEOS 0.3 0.3  --    ABAS 0.0 0.0  --      CMP  Recent Labs   Lab Test 04/09/19  1328 10/10/17  1359 03/24/17  0953 03/08/17  1108   NA  --   --  139  --    POTASSIUM  --   --  4.1  --    CHLORIDE  --   --  104  --    CO2  --   --  29  --    ANIONGAP  --   --  6  --    GLC  --   --  80 86   BUN  --   --  13  --    CR 1.04 1.08 0.82  --    GFRESTIMATED >90 81 >90  Non  GFR Calc    --    GFRESTBLACK >90 >90 >90  African American GFR Calc    --    ZUMA  --   --  8.7  --    BILITOTAL 0.6 0.6 0.5  --    ALBUMIN 4.6 4.3 4.3  --    PROTTOTAL 8.1 7.8 7.6  --    ALKPHOS 64 67 71  --    AST 23 31 24  --    ALT 31 47 29  --  "     Calcium/VitaminD  Recent Labs   Lab Test 03/24/17  0953   UZMA 8.7     ESR/CRP  Recent Labs   Lab Test 04/09/19  1328 10/10/17  1359 03/24/17  0953   SED 7 6 7   CRP <2.9 <2.9  --      Tuberculosis Screening  Recent Labs   Lab Test 10/10/17  1359   TBRSLT Negative   TBAGN 0.00     Immunization History     Immunization History   Administered Date(s) Administered     Influenza Vaccine IM > 6 months Valent IIV4 10/10/2017     Pneumo Conj 13-V (2010&after) 01/21/2016     Pneumococcal 23 valent 10/10/2017     Tdap (Adacel,Boostrix) 04/27/2016          Chart documentation done in part with Dragon Voice recognition Software. Although reviewed after completion, some word and grammatical error may remain.    Bobby Middleton MD

## 2020-01-30 LAB
GAMMA INTERFERON BACKGROUND BLD IA-ACNC: 0.02 IU/ML
M TB IFN-G BLD-IMP: NEGATIVE
M TB IFN-G CD4+ BCKGRND COR BLD-ACNC: >10 IU/ML
MITOGEN IGNF BCKGRD COR BLD-ACNC: 0 IU/ML
MITOGEN IGNF BCKGRD COR BLD-ACNC: 0.01 IU/ML

## 2020-07-24 DIAGNOSIS — M45.7 ANKYLOSING SPONDYLITIS OF LUMBOSACRAL REGION (H): ICD-10-CM

## 2020-07-24 LAB
ALBUMIN SERPL-MCNC: 4.4 G/DL (ref 3.4–5)
ALP SERPL-CCNC: 63 U/L (ref 40–150)
ALT SERPL W P-5'-P-CCNC: 35 U/L (ref 0–70)
AST SERPL W P-5'-P-CCNC: 29 U/L (ref 0–45)
BASOPHILS # BLD AUTO: 0 10E9/L (ref 0–0.2)
BASOPHILS NFR BLD AUTO: 0.5 %
BILIRUB DIRECT SERPL-MCNC: 0.2 MG/DL (ref 0–0.2)
BILIRUB SERPL-MCNC: 0.9 MG/DL (ref 0.2–1.3)
CREAT SERPL-MCNC: 0.93 MG/DL (ref 0.66–1.25)
CRP SERPL-MCNC: <2.9 MG/L (ref 0–8)
DIFFERENTIAL METHOD BLD: NORMAL
EOSINOPHIL # BLD AUTO: 0.7 10E9/L (ref 0–0.7)
EOSINOPHIL NFR BLD AUTO: 13.5 %
ERYTHROCYTE [DISTWIDTH] IN BLOOD BY AUTOMATED COUNT: 11.9 % (ref 10–15)
ERYTHROCYTE [SEDIMENTATION RATE] IN BLOOD BY WESTERGREN METHOD: 5 MM/H (ref 0–15)
GFR SERPL CREATININE-BSD FRML MDRD: >90 ML/MIN/{1.73_M2}
HCT VFR BLD AUTO: 40.7 % (ref 40–53)
HGB BLD-MCNC: 14.8 G/DL (ref 13.3–17.7)
LYMPHOCYTES # BLD AUTO: 1.9 10E9/L (ref 0.8–5.3)
LYMPHOCYTES NFR BLD AUTO: 34.4 %
MCH RBC QN AUTO: 31.4 PG (ref 26.5–33)
MCHC RBC AUTO-ENTMCNC: 36.4 G/DL (ref 31.5–36.5)
MCV RBC AUTO: 86 FL (ref 78–100)
MONOCYTES # BLD AUTO: 0.6 10E9/L (ref 0–1.3)
MONOCYTES NFR BLD AUTO: 10.7 %
NEUTROPHILS # BLD AUTO: 2.3 10E9/L (ref 1.6–8.3)
NEUTROPHILS NFR BLD AUTO: 40.9 %
PLATELET # BLD AUTO: 218 10E9/L (ref 150–450)
PROT SERPL-MCNC: 8.1 G/DL (ref 6.8–8.8)
RBC # BLD AUTO: 4.71 10E12/L (ref 4.4–5.9)
WBC # BLD AUTO: 5.5 10E9/L (ref 4–11)

## 2020-07-24 PROCEDURE — 82565 ASSAY OF CREATININE: CPT | Performed by: INTERNAL MEDICINE

## 2020-07-24 PROCEDURE — 85025 COMPLETE CBC W/AUTO DIFF WBC: CPT | Performed by: INTERNAL MEDICINE

## 2020-07-24 PROCEDURE — 85652 RBC SED RATE AUTOMATED: CPT | Performed by: INTERNAL MEDICINE

## 2020-07-24 PROCEDURE — 80076 HEPATIC FUNCTION PANEL: CPT | Performed by: INTERNAL MEDICINE

## 2020-07-24 PROCEDURE — 86140 C-REACTIVE PROTEIN: CPT | Performed by: INTERNAL MEDICINE

## 2020-07-24 PROCEDURE — 36415 COLL VENOUS BLD VENIPUNCTURE: CPT | Performed by: INTERNAL MEDICINE

## 2020-07-28 ENCOUNTER — MYC MEDICAL ADVICE (OUTPATIENT)
Dept: RHEUMATOLOGY | Facility: CLINIC | Age: 30
End: 2020-07-28

## 2020-07-29 ENCOUNTER — VIRTUAL VISIT (OUTPATIENT)
Dept: RHEUMATOLOGY | Facility: CLINIC | Age: 30
End: 2020-07-29
Payer: COMMERCIAL

## 2020-07-29 DIAGNOSIS — M45.7 ANKYLOSING SPONDYLITIS OF LUMBOSACRAL REGION (H): Primary | ICD-10-CM

## 2020-07-29 DIAGNOSIS — Z79.899 HIGH RISK MEDICATION USE: ICD-10-CM

## 2020-07-29 DIAGNOSIS — J30.2 SEASONAL ALLERGIES: ICD-10-CM

## 2020-07-29 PROCEDURE — 99213 OFFICE O/P EST LOW 20 MIN: CPT | Mod: 95 | Performed by: INTERNAL MEDICINE

## 2020-07-29 RX ORDER — NAPROXEN SODIUM 220 MG
220 TABLET ORAL 2 TIMES DAILY PRN
COMMUNITY
Start: 2020-07-29

## 2020-07-29 NOTE — PROGRESS NOTES
"Dayday Buck is a 29 year old male who is being evaluated via a billable telephone visit.      The patient has been notified of following:     \"This telephone visit will be conducted via a call between you and your physician/provider. We have found that certain health care needs can be provided without the need for a physical exam.  This service lets us provide the care you need with a short phone conversation.  If a prescription is necessary we can send it directly to your pharmacy.  If lab work is needed we can place an order for that and you can then stop by our lab to have the test done at a later time.    Telephone visits are billed at different rates depending on your insurance coverage. During this emergency period, for some insurers they may be billed the same as an in-person visit.  Please reach out to your insurance provider with any questions.    If during the course of the call the physician/provider feels a telephone visit is not appropriate, you will not be charged for this service.\"    Patient has given verbal consent for Telephone visit?  Yes    What phone number would you like to be contacted at? [phone]    How would you like to obtain your AVS? [facility]    Rheumatology Telephone/Telehealth  Visit      Dayday Buck MRN# [id number]   YOB: 1990 Age: 29 year old      Date of visit: 7/29/20   PCP: Lamin Zuniga    Chief Complaint   Patient presents with:  Ankylosing spondylitis: Still aches. Has had sinus problems for 3 months    Assessment and Plan     1. Ankylosing Spondylitis: Diagnosed in 2014 by Dr. Tammi Murrieta, rheumatologist in [place], [place].  8/22/2014 [facility] MRI of the right hip showed sacroiliitis.  He has been on Humira since 2014 with resolution of his initial symptoms that included pain and stiffness of his bilateral hips, lower back, and neck.  Currently on Humira and naproxen and doing well.  Naproxen is used about 2 days/week.  .   - Continue Humira " 40mg SQ every 14 days   - Naproxen 220mg BID PRN; purchases OTC  - Labs in 6 months: CBC, Creatinine, Hepatic Panel, ESR, CRP, hepatitis B/C    2. Post-nasal drip, seasonal allergies, nasal congestion: Discussed seeing an allergist.  He will try using an oral antihistamine such as Zyrtec, and Flonase.  If no improvement then he will plan to see an allergist.    # Relevant labs and imaging were reviewed with the patient    # High risk medication toxicity monitoring: discussion and labs reviewed; appropriate labs ordered. See above.  Instructed that if confirmed to have COVID-19 or exposure to someone with confirmed COVID-19 to call this clinic for directions on DMARD management.    # Note that this is a virtual visit to reduce the risk of COVID-19 exposure during this current pandemic.      # Considered to be at high risk of complications from the COVID-19 virus.  It is recommended to limit contact with other people and if possible to work remotely or provide a leave of absence to reduce the risk for COVID-19.      Mr. Buck verbalized agreement with and understanding of the rational for the diagnosis and treatment plan.  All questions were answered to best of my ability and the patient's satisfaction. Mr. Buck was advised to contact the clinic with any questions that may arise after the clinic visit.      Thank you for involving me in the care of the patient    Return to clinic: 6 months      HPI   Dayday Buck is a 29 year old male with a past medical history significant for ankylosing spondylitis who is seen for f/u of ankylosing spondylitis.    10/7/2016 rheumatology clinic note by Dr. Tammi Murrieta in [place], [place], documents HLA-B27 positive, sacroiliitis on MRI.  On Humira, naproxen PRN. Ankylosing Spondylitis.      2014 right hip MRI showed sacroiliitis    4/9/2019: Mr. Buck reported that he was doing great on Humira but he lost his job and insurance in December 2018 so has been off Humira since then.  He did  well for about 2 months but then on month 2-4 he has noticed an increased use of naproxen.  He now uses naproxen 220-440 mg twice daily that is helpful for his lower back pain and stiffness.  Morning stiffness for about 1 hour that improves with activity and time and NSAIDs.  He would like to restart Humira.      10/8/2019: no showed to scheduled clinic visit    1/29/2020: doing well when taking humira every 2 weeks, but often late by 1-2 weeks and symptoms return when late.  Mostly back issues when more symptomatic, and morning stiffness for about 1 hour. Says that he will be better about taking Humira with the help from phone reminders and his wife.     Today, 7/29/2020: doing better with taking Humira consistently; every 14 days.  Using naproxen 220mg BID PRN, about 2 days per week. More symptomatic for about 1 day prior to his dose of Humira, but very mildly increased achiness he says. Generally the achiness is just related to exercise.  He also notes having seasonal allergies, nasal congestion, and a postnasal drip but no cough.  He wonders what he should do for his allergies.    Denies fevers, chills, nausea, vomiting, constipation, diarrhea. No abdominal pain. No chest pain/pressure, palpitations, or shortness of breath. No LE swelling. No neck pain. No oral or nasal sores.  No sicca symptoms. No photosensitivity or photophobia. No eye pain or redness. No history of inflammatory eye disease.  No history of inflammatory bowel disease.    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 No oral or nasal ulcers.  See HPI  CV: No chest pain, pressure, palpitations, or dyspnea on exertion.  PULM: No SOB, wheeze, cough.   GI: No nausea, vomiting, constipation, diarrhea. No blood in stool. No abdominal pain.  : No blood  in urine.  MSK: See HPI.  NEURO: No numbness, tingling, or weakness.  EXT: No LE swelling  PSYCH: Negative    Active Problem List     Patient Active Problem List   Diagnosis     AS (ankylosing spondylitis) (H)     Acne vulgaris     Past Medical History     Past Medical History:   Diagnosis Date     Acne      AS (ankylosing spondylitis) (H)      Dyspepsia      Past Surgical History     Past Surgical History:   Procedure Laterality Date     HEAD & NECK SURGERY      wisdom tooth extraction     ORTHOPEDIC SURGERY      3 arthroscopic knee surgery for ACL repair     Allergy     Allergies   Allergen Reactions     No Known Allergies      Current Medication List     Current Outpatient Medications   Medication Sig     adalimumab (HUMIRA *CF*) 40 MG/0.4ML prefilled syringe kit Inject 0.4 mLs (40 mg) Subcutaneous every 14 days . Hold for signs of infection, then seek medical attention.     naproxen (NAPROSYN) 250 MG tablet Take 1-2 tablets (250-500 mg) by mouth 2 times daily as needed for moderate pain     No current facility-administered medications for this visit.      Social History   See HPI    Family History     Family History   Problem Relation Age of Onset     Family History Negative Mother      Cancer Father         skin cancer basal cell     Family History Negative Brother      Family History Negative Sister      Family History Negative Sister      Breast Cancer Maternal Aunt      No FHx of rheumatologic disease    Physical Exam     Temp Readings from Last 3 Encounters:   04/09/19 98.7  F (37.1  C) (Oral)   04/01/19 98.5  F (36.9  C) (Oral)   04/10/18 97.8  F (36.6  C) (Oral)     BP Readings from Last 5 Encounters:   01/29/20 137/75   04/09/19 137/82   04/01/19 116/80   04/10/18 127/79   10/10/17 130/76     Pulse Readings from Last 1 Encounters:   01/29/20 60     Resp Readings from Last 1 Encounters:   04/09/19 16     Estimated body mass index is 27.55 kg/m  as calculated from the following:    Height as of 1/29/20:  "1.727 m (5' 8\").    Weight as of 1/29/20: 82.2 kg (181 lb 3.2 oz).    Telephone Visit     Labs / Imaging (select studies)     CBC  Recent Labs   Lab Test 07/24/20 0826 01/29/20  1038 04/09/19  1328   WBC 5.5 6.6 7.2   RBC 4.71 4.65 4.89   HGB 14.8 14.1 15.1   HCT 40.7 40.0 41.9   MCV 86 86 86   RDW 11.9 11.9 11.7    235 258   MCH 31.4 30.3 30.9   MCHC 36.4 35.3 36.0   NEUTROPHIL 40.9 46.0 53.7   LYMPH 34.4 38.1 32.0   MONOCYTE 10.7 10.0 10.0   EOSINOPHIL 13.5 5.3 4.0   BASOPHIL 0.5 0.6 0.3   ANEU 2.3 3.0 3.9   ALYM 1.9 2.5 2.3   EPI 0.6 0.7 0.7   AEOS 0.7 0.4 0.3   ABAS 0.0 0.0 0.0     CMP  Recent Labs   Lab Test 07/24/20 0826 01/29/20  1038 04/09/19  1328  03/24/17  0953 03/08/17  1108   NA  --   --   --   --  139  --    POTASSIUM  --   --   --   --  4.1  --    CHLORIDE  --   --   --   --  104  --    CO2  --   --   --   --  29  --    ANIONGAP  --   --   --   --  6  --    GLC  --   --   --   --  80 86   BUN  --   --   --   --  13  --    CR 0.93 0.84 1.04   < > 0.82  --    GFRESTIMATED >90 >90 >90   < > >90  Non  GFR Calc    --    GFRESTBLACK >90 >90 >90   < > >90  African American GFR Calc    --    UZMA  --   --   --   --  8.7  --    BILITOTAL 0.9 0.4 0.6   < > 0.5  --    ALBUMIN 4.4 4.5 4.6   < > 4.3  --    PROTTOTAL 8.1 7.8 8.1   < > 7.6  --    ALKPHOS 63 54 64   < > 71  --    AST 29 22 23   < > 24  --    ALT 35 24 31   < > 29  --     < > = values in this interval not displayed.     Calcium/VitaminD  Recent Labs   Lab Test 03/24/17  0953   UZMA 8.7     ESR/CRP  Recent Labs   Lab Test 07/24/20  0826 01/29/20  1038 04/09/19  1328   SED 5 5 7   CRP <2.9 <2.9 <2.9     Tuberculosis Screening  Recent Labs   Lab Test 01/29/20  1038 10/10/17  1359   TBRES Negative  --    TBRSLT  --  Negative   TBAGN  --  0.00     Immunization History     Immunization History   Administered Date(s) Administered     Influenza Vaccine IM > 6 months Valent IIV4 10/10/2017, 01/29/2020     Pneumo Conj 13-V (2010&after) " 01/21/2016     Pneumococcal 23 valent 10/10/2017     Tdap (Adacel,Boostrix) 04/27/2016          Chart documentation done in part with Dragon Voice recognition Software. Although reviewed after completion, some word and grammatical error may remain.    Phone call start time: 10:20 AM  Phone call end time: 10:33 AM    This visit is equivalent to a 63255 visit    Location of patient: home, in [place]  Location of provider: home    Follow up:  follow up appointment scheduled to be in jan 2021    Bobby Middleton MD

## 2020-09-08 ENCOUNTER — MYC MEDICAL ADVICE (OUTPATIENT)
Dept: RHEUMATOLOGY | Facility: CLINIC | Age: 30
End: 2020-09-08

## 2020-09-08 NOTE — TELEPHONE ENCOUNTER
Reason for Call:  Other call back    Detailed comments: Patient calling in regards to the Zojit message that he had sent. Patient would like a for the provider to call his pharmacy regarding the medication. Please call pharmacy.    Phone Number Patient can be reached at: Cell number on file:    Telephone Information:   Mobile [phone]       Best Time: any    Can we leave a detailed message on this number? YES    Call taken on 9/8/2020 at 4:18 PM by Aaron Pedersen

## 2020-09-08 NOTE — TELEPHONE ENCOUNTER
Contacted [facility] and spoke to a pharmacist Kelly. Provided a verbal for a replacement pen of Humira CF 40mg/0.4 ml.    Karan Car RN....9/8/2020 4:47 PM

## 2020-10-20 NOTE — TELEPHONE ENCOUNTER
Prior Authorization Approval    Authorization Effective Date: 4/18/2019  Authorization Expiration Date: 4/18/2020  Medication: humira pa approved  Approved Dose/Quantity:40/0.4  Reference #: [id number]   Insurance Company: [employer] [place] - Phone [phone] Fax [phone]  Expected CoPay: $5     CoPay Card Available: Yes    Foundation Assistance Needed: na  Which Pharmacy is filling the prescription (Not needed for infusion/clinic administered): [facility] - [place], [place] - [address]  Pharmacy Notified: Yes  Patient Notified: Yes       colonoscopy -4 years ago - normal as per patient

## 2020-11-16 ENCOUNTER — VIRTUAL VISIT (OUTPATIENT)
Dept: FAMILY MEDICINE | Facility: CLINIC | Age: 30
End: 2020-11-16
Payer: COMMERCIAL

## 2020-11-16 DIAGNOSIS — J34.89 SINUS PAIN: Primary | ICD-10-CM

## 2020-11-16 DIAGNOSIS — L98.9 SKIN LESION: ICD-10-CM

## 2020-11-16 PROCEDURE — 99213 OFFICE O/P EST LOW 20 MIN: CPT | Mod: 95 | Performed by: PHYSICIAN ASSISTANT

## 2020-11-16 NOTE — PROGRESS NOTES
"Dayday Buck is a 30 year old male who is being evaluated via a billable telephone visit.      The patient has been notified of following:     \"This telephone visit will be conducted via a call between you and your physician/provider. We have found that certain health care needs can be provided without the need for a physical exam.  This service lets us provide the care you need with a short phone conversation.  If a prescription is necessary we can send it directly to your pharmacy.  If lab work is needed we can place an order for that and you can then stop by our lab to have the test done at a later time.    Telephone visits are billed at different rates depending on your insurance coverage. During this emergency period, for some insurers they may be billed the same as an in-person visit.  Please reach out to your insurance provider with any questions.    If during the course of the call the physician/provider feels a telephone visit is not appropriate, you will not be charged for this service.\"    Patient has given verbal consent for Telephone visit?  Yes    What phone number would you like to be contacted at? [phone]    How would you like to obtain your AVS? [facility] Ryan     Dayday Buck is a 30 year old male who presents via phone visit today for the following health issues:    HPI      Patient wanting dermatology referral for some spot concerns.     Allergies  Onset/Duration: 8 months, did not have allergies before, discussed with rheumatologist and was advised to take OTC allergy medication.   Symptoms:   Nasal congestion: YES, post nasal congestion feeling  Sneezing: YES, sometimes  Red, itchy eyes: no  Progression of Symptoms: same  Accompanying Signs & Symptoms:  Cough: no  Wheezing: no  Rash: at the beginning patient had very dry skin on his eyelid, patient put ointment on it. 4-5 days of cortisone cream and that helped.   Sinus/facial pain: no  History:   Is it seasonal: none  History of " "Asthma: no  Has allergy testing been done: no  Precipitating factors:   None  Alleviating factors:  None  Therapies tried and outcome: OTC Claritin at night and flonase during the day.     Patient Active Problem List   Diagnosis     AS (ankylosing spondylitis) (H)     Acne vulgaris      Current Outpatient Medications   Medication     adalimumab (HUMIRA *CF*) 40 MG/0.4ML prefilled syringe kit     amoxicillin-clavulanate (AUGMENTIN) 875-125 MG tablet     naproxen sodium (ANAPROX) 220 MG tablet     No current facility-administered medications for this visit.            Review of Systems   Constitutional, HEENT, cardiovascular, pulmonary, gi and gu systems are negative, except as otherwise noted.       Objective          Vitals:  No vitals were obtained today due to virtual visit.    healthy, alert and no distress  PSYCH: Alert and oriented times 3; coherent speech, normal   rate and volume, able to articulate logical thoughts, able   to abstract reason, no tangential thoughts, no hallucinations   or delusions  His affect is normal  RESP: No cough, no audible wheezing, able to talk in full sentences  Remainder of exam unable to be completed due to telephone visits          Assessment/Plan:    Assessment & Plan     Sinus pain  Will treat. This prescription is given with a discussion of side effects, risks and proper use.  Instructions are given to follow up if not improving or symptoms change or worsen as discussed.   - amoxicillin-clavulanate (AUGMENTIN) 875-125 MG tablet; Take 1 tablet by mouth 2 times daily    Skin lesion  Referred as requested   - DERMATOLOGY ADULT REFERRAL; Future     BMI:   Estimated body mass index is 27.55 kg/m  as calculated from the following:    Height as of 1/29/20: 1.727 m (5' 8\").    Weight as of 1/29/20: 82.2 kg (181 lb 3.2 oz).              No follow-ups on file.    FABIENNE ALEGRE PA-C  [facility]    Phone call duration:  11 minutes                "

## 2020-11-23 ASSESSMENT — ENCOUNTER SYMPTOMS
MYALGIAS: 0
NERVOUS/ANXIOUS: 0
DYSURIA: 0
NAUSEA: 0
ARTHRALGIAS: 0
EYE PAIN: 0
JOINT SWELLING: 0
DIARRHEA: 0
PARESTHESIAS: 0
CONSTIPATION: 0
COUGH: 0
CHILLS: 0
PALPITATIONS: 0
SORE THROAT: 0
FEVER: 0
SHORTNESS OF BREATH: 0
HEMATURIA: 0
FREQUENCY: 1
HEMATOCHEZIA: 0
HEARTBURN: 0
DIZZINESS: 0
ABDOMINAL PAIN: 0
HEADACHES: 0

## 2020-11-24 ENCOUNTER — OFFICE VISIT (OUTPATIENT)
Dept: FAMILY MEDICINE | Facility: CLINIC | Age: 30
End: 2020-11-24
Payer: COMMERCIAL

## 2020-11-24 VITALS
HEART RATE: 61 BPM | TEMPERATURE: 97.3 F | WEIGHT: 185.2 LBS | OXYGEN SATURATION: 99 % | BODY MASS INDEX: 27.43 KG/M2 | HEIGHT: 69 IN | DIASTOLIC BLOOD PRESSURE: 76 MMHG | SYSTOLIC BLOOD PRESSURE: 120 MMHG

## 2020-11-24 DIAGNOSIS — J34.89 SINUS PAIN: ICD-10-CM

## 2020-11-24 DIAGNOSIS — Z78.9 VEGAN DIET: ICD-10-CM

## 2020-11-24 DIAGNOSIS — Z00.00 ROUTINE GENERAL MEDICAL EXAMINATION AT A HEALTH CARE FACILITY: Primary | ICD-10-CM

## 2020-11-24 DIAGNOSIS — M45.8 ANKYLOSING SPONDYLITIS OF SACRAL REGION (H): ICD-10-CM

## 2020-11-24 LAB — VIT B12 SERPL-MCNC: 452 PG/ML (ref 193–986)

## 2020-11-24 PROCEDURE — 90471 IMMUNIZATION ADMIN: CPT | Performed by: PHYSICIAN ASSISTANT

## 2020-11-24 PROCEDURE — 82607 VITAMIN B-12: CPT | Performed by: PHYSICIAN ASSISTANT

## 2020-11-24 PROCEDURE — 80061 LIPID PANEL: CPT | Performed by: PHYSICIAN ASSISTANT

## 2020-11-24 PROCEDURE — 99395 PREV VISIT EST AGE 18-39: CPT | Mod: 25 | Performed by: PHYSICIAN ASSISTANT

## 2020-11-24 PROCEDURE — 82728 ASSAY OF FERRITIN: CPT | Performed by: PHYSICIAN ASSISTANT

## 2020-11-24 PROCEDURE — 90686 IIV4 VACC NO PRSV 0.5 ML IM: CPT | Performed by: PHYSICIAN ASSISTANT

## 2020-11-24 PROCEDURE — 36415 COLL VENOUS BLD VENIPUNCTURE: CPT | Performed by: PHYSICIAN ASSISTANT

## 2020-11-24 PROCEDURE — 82947 ASSAY GLUCOSE BLOOD QUANT: CPT | Performed by: PHYSICIAN ASSISTANT

## 2020-11-24 ASSESSMENT — ENCOUNTER SYMPTOMS
DIARRHEA: 0
EYE PAIN: 0
HEARTBURN: 0
ARTHRALGIAS: 0
PALPITATIONS: 0
ABDOMINAL PAIN: 0
FEVER: 0
MYALGIAS: 0
PARESTHESIAS: 0
NERVOUS/ANXIOUS: 0
SORE THROAT: 0
HEADACHES: 0
NAUSEA: 0
CHILLS: 0
DIZZINESS: 0
CONSTIPATION: 0
FREQUENCY: 1
SHORTNESS OF BREATH: 0
HEMATURIA: 0
DYSURIA: 0
COUGH: 0
HEMATOCHEZIA: 0
JOINT SWELLING: 0

## 2020-11-24 ASSESSMENT — MIFFLIN-ST. JEOR: SCORE: 1781.31

## 2020-11-24 NOTE — PATIENT INSTRUCTIONS
1. Dermatology: Phone: [phone]      Preventive Health Recommendations  Male Ages 26 - 39    Yearly exam:             See your health care provider every year in order to  o   Review health changes.   o   Discuss preventive care.    o   Review your medicines if your doctor has prescribed any.    You should be tested each year for STDs (sexually transmitted diseases), if you re at risk.     After age 35, talk to your provider about cholesterol testing. If you are at risk for heart disease, have your cholesterol tested at least every 5 years.     If you are at risk for diabetes, you should have a diabetes test (fasting glucose).  Shots: Get a flu shot each year. Get a tetanus shot every 10 years.     Nutrition:    Eat at least 5 servings of fruits and vegetables daily.     Eat whole-grain bread, whole-wheat pasta and brown rice instead of white grains and rice.     Get adequate Calcium and Vitamin D.     Lifestyle    Exercise for at least 150 minutes a week (30 minutes a day, 5 days a week). This will help you control your weight and prevent disease.     Limit alcohol to one drink per day.     No smoking.     Wear sunscreen to prevent skin cancer.     See your dentist every six months for an exam and cleaning.

## 2020-11-24 NOTE — PROGRESS NOTES
SUBJECTIVE:   CC: Dayday Buck is an 30 year old male who presents for preventative health visit.     Patient has been advised of split billing requirements and indicates understanding: Yes  Healthy Habits:     Getting at least 3 servings of Calcium per day:  Yes    Bi-annual eye exam:  NO    Dental care twice a year:  Yes    Sleep apnea or symptoms of sleep apnea:  None    Diet:  Vegetarian/vegan    Frequency of exercise:  2-3 days/week    Duration of exercise:  30-45 minutes    Taking medications regularly:  Yes    Medication side effects:  Not applicable    PHQ-2 Total Score: 0    Additional concerns today:  No    Today's PHQ-2 Score:   PHQ-2 ( 1999 Pfizer) 11/23/2020   Q1: Little interest or pleasure in doing things 0   Q2: Feeling down, depressed or hopeless 0   PHQ-2 Score 0   Q1: Little interest or pleasure in doing things Not at all   Q2: Feeling down, depressed or hopeless Not at all   PHQ-2 Score 0     Abuse: Current or Past(Physical, Sexual or Emotional)- No  Do you feel safe in your environment? Yes        Social History     Tobacco Use     Smoking status: Never Smoker     Smokeless tobacco: Never Used   Substance Use Topics     Alcohol use: Yes     Alcohol/week: 0.0 standard drinks     Comment: 1 drinks a week     If you drink alcohol do you typically have >3 drinks per day or >7 drinks per week? No    Alcohol Use 11/24/2020   Prescreen: >3 drinks/day or >7 drinks/week? -   Prescreen: >3 drinks/day or >7 drinks/week? No       Last PSA: No results found for: PSA    Reviewed orders with patient. Reviewed health maintenance and updated orders accordingly - Yes  Lab work is in process    Reviewed and updated as needed this visit by clinical staff  Tobacco  Allergies  Meds   Med Hx  Surg Hx  Fam Hx  Soc Hx        Reviewed and updated as needed this visit by Provider                    Review of Systems   Constitutional: Negative for chills and fever.   HENT: Positive for congestion. Negative for ear  "pain, hearing loss and sore throat.    Eyes: Negative for pain and visual disturbance.   Respiratory: Negative for cough and shortness of breath.    Cardiovascular: Negative for chest pain, palpitations and peripheral edema.   Gastrointestinal: Negative for abdominal pain, constipation, diarrhea, heartburn, hematochezia and nausea.   Genitourinary: Positive for frequency. Negative for discharge, dysuria, genital sores, hematuria, impotence and urgency.   Musculoskeletal: Negative for arthralgias, joint swelling and myalgias.   Skin: Negative for rash.   Neurological: Negative for dizziness, headaches and paresthesias.   Psychiatric/Behavioral: Negative for mood changes. The patient is not nervous/anxious.        OBJECTIVE:   /76 (BP Location: Right arm, Patient Position: Chair, Cuff Size: Adult Regular)   Pulse 61   Temp 97.3  F (36.3  C) (Tympanic)   Ht 1.746 m (5' 8.74\")   Wt 84 kg (185 lb 3.2 oz)   SpO2 99%   BMI 27.56 kg/m      Physical Exam  GENERAL: healthy, alert and no distress  EYES: Eyes grossly normal to inspection, PERRL and conjunctivae and sclerae normal  HENT: ear canals and TM's normal, nose and mouth without ulcers or lesions  NECK: no adenopathy, no asymmetry, masses, or scars and thyroid normal to palpation  RESP: lungs clear to auscultation - no rales, rhonchi or wheezes  CV: regular rate and rhythm, normal S1 S2, no S3 or S4, no murmur, click or rub, no peripheral edema and peripheral pulses strong  ABDOMEN: soft, nontender, no hepatosplenomegaly, no masses and bowel sounds normal  MS: no gross musculoskeletal defects noted, no edema  SKIN: no suspicious lesions or rashes  NEURO: Normal strength and tone, mentation intact and speech normal  PSYCH: mentation appears normal, affect normal/bright  LYMPH: no cervical, supraclavicular, axillary, or inguinal adenopathy    Diagnostic Test Results:  Labs reviewed in Epic    ASSESSMENT/PLAN:   (Z00.00) Routine general medical examination at a " "health care facility  (primary encounter diagnosis)  Comment: Well person   Plan: Lipid panel reflex to direct LDL Fasting,         Glucose        Diet, exercise, wellness and other preventive recommendations related to health maintenance were discussed.  Follow up as needed for acute issues.  Physical exam in 1 year.     (M45.8) Ankylosing spondylitis of sacral region (H)  Comment:   Plan: Managed by rheumatology    (Z78.9) Vegan diet  Comment:   Plan: Ferritin, Vitamin B12        Check baseline     (J34.89) Sinus pain  Comment:   Plan: amoxicillin-clavulanate (AUGMENTIN) 875-125 MG         tablet        Improving.       Patient has been advised of split billing requirements and indicates understanding: Yes  COUNSELING:   Reviewed preventive health counseling, as reflected in patient instructions    Estimated body mass index is 27.56 kg/m  as calculated from the following:    Height as of this encounter: 1.746 m (5' 8.74\").    Weight as of this encounter: 84 kg (185 lb 3.2 oz).     He reports that he has never smoked. He has never used smokeless tobacco.      Counseling Resources:  ATP IV Guidelines  Pooled Cohorts Equation Calculator  FRAX Risk Assessment  ICSI Preventive Guidelines  Dietary Guidelines for Americans, 2010  USDA's MyPlate  ASA Prophylaxis  Lung CA Screening    FABIENNE ALEGRE PA-C  [facility]  "

## 2020-11-26 LAB
CHOLEST SERPL-MCNC: 206 MG/DL
FERRITIN SERPL-MCNC: 42 NG/ML (ref 26–388)
GLUCOSE SERPL-MCNC: 69 MG/DL (ref 70–99)
HDLC SERPL-MCNC: 37 MG/DL
LDLC SERPL CALC-MCNC: 142 MG/DL
NONHDLC SERPL-MCNC: 169 MG/DL
TRIGL SERPL-MCNC: 136 MG/DL

## 2021-01-26 ENCOUNTER — VIRTUAL VISIT (OUTPATIENT)
Dept: RHEUMATOLOGY | Facility: CLINIC | Age: 31
End: 2021-01-26
Payer: COMMERCIAL

## 2021-01-26 DIAGNOSIS — Z79.1 ENCOUNTER FOR MONITORING CHRONIC NSAID THERAPY: ICD-10-CM

## 2021-01-26 DIAGNOSIS — Z51.81 ENCOUNTER FOR MONITORING CHRONIC NSAID THERAPY: ICD-10-CM

## 2021-01-26 DIAGNOSIS — M45.7 ANKYLOSING SPONDYLITIS OF LUMBOSACRAL REGION (H): Primary | ICD-10-CM

## 2021-01-26 PROCEDURE — 99213 OFFICE O/P EST LOW 20 MIN: CPT | Mod: 95 | Performed by: INTERNAL MEDICINE

## 2021-01-26 NOTE — PATIENT INSTRUCTIONS
RHEUMATOLOGY    Dr. Bobby Middleton    [facility]  [address] [place] [address]    Our new phone number for Rheumatology is [phone], this number will be able to help you schedule appointments for Dr. Middleton or if you have any message you would like sent to us.    Thank you for choosing [facility]!  Kassidy Dave [facility] Rheumatology

## 2021-01-26 NOTE — PROGRESS NOTES
Dayday Buck is a 30 year old year old male who is being evaluated via a billable telephone visit.      What phone number would you like to be contacted at? [phone]  How would you like to obtain your AVS? [facility]    Rheumatology Telephone/Telehealth Visit      Dayday Buck MRN# [id number]   YOB: 1990 Age: 30 year old      Date of visit: 1/26/21   PCP: Lamin Zuniga    Chief Complaint   Patient presents with:  Ankylosing spondylitis    Assessment and Plan     1. Ankylosing Spondylitis: Diagnosed in 2014 by Dr. Tammi Murrieta, rheumatologist in [place], [place].  8/22/2014 [facility] MRI of the right hip showed sacroiliitis.  He has been on Humira since 2014 with resolution of his initial symptoms that included pain and stiffness of his bilateral hips, lower back, and neck.  Currently on Humira and naproxen and doing well.  Naproxen is used about 2 days/week for arthritis but more frequently due to dental issue so will check labs for chronic NSAID use. Chronic illness, stable  - Continue Humira 40mg SQ every 14 days   - Naproxen 220mg BID PRN; purchases OTC  - Labs now: CBC, Creatinine, Hepatic Panel, ESR, CRP, hepatitis B/C    2. Tooth ache: since crown placed in Nov 2020; using naproxen and orajel for pain. Advised following up with his dentist to have addressed and he says that he is going to do this.    Discussed the COVID19 vaccine    #  Instructed that if confirmed to have COVID-19 or exposure to someone with confirmed COVID-19 to call this clinic for directions on DMARD management.    # This is a virtual visit to reduce the risk of COVID-19 exposure during this current pandemic.      # Considered to be at high risk of complications from the COVID-19 virus.  It is recommended to limit contact with other people and if possible to work remotely or provide a leave of absence to reduce the risk for COVID-19.      Total minutes spent in evaluation with patient, review of pertinent lab  tests and chart notes, and documentation: 11     Mr. Buck verbalized agreement with and understanding of the rational for the diagnosis and treatment plan.  All questions were answered to best of my ability and the patient's satisfaction. Mr. Buck was advised to contact the clinic with any questions that may arise after the clinic visit.      Thank you for involving me in the care of the patient    Return to clinic: 6 months      HPI   Dayday Buck is a 30 year old male with a past medical history significant for ankylosing spondylitis who is seen for f/u of ankylosing spondylitis.    10/7/2016 rheumatology clinic note by Dr. Tammi Murrieta in [place], [place], documents HLA-B27 positive, sacroiliitis on MRI.  On Humira, naproxen PRN. Ankylosing Spondylitis.      2014 right hip MRI showed sacroiliitis    4/9/2019: Mr. Buck reported that he was doing great on Humira but he lost his job and insurance in December 2018 so has been off Humira since then.  He did well for about 2 months but then on month 2-4 he has noticed an increased use of naproxen.  He now uses naproxen 220-440 mg twice daily that is helpful for his lower back pain and stiffness.  Morning stiffness for about 1 hour that improves with activity and time and NSAIDs.  He would like to restart Humira.      10/8/2019: no showed to scheduled clinic visit    1/29/2020: doing well when taking humira every 2 weeks, but often late by 1-2 weeks and symptoms return when late.  Mostly back issues when more symptomatic, and morning stiffness for about 1 hour. Says that he will be better about taking Humira with the help from phone reminders and his wife.     7/29/2020: doing better with taking Humira consistently; every 14 days.  Using naproxen 220mg BID PRN, about 2 days per week. More symptomatic for about 1 day prior to his dose of Humira, but very mildly increased achiness he says. Generally the achiness is just related to exercise.  He also notes having seasonal  allergies, nasal congestion, and a postnasal drip but no cough.  He wonders what he should do for his allergies.    Today, 1/26/2021: dental work (crown) in Nov 2020 with pain since then and using naproxen for the tooth pain; hasn't followed up with his dentist.  AS is doing well without pain or stiffness.  Tolerating naproxen and Humira well. No GERD symptoms. No blood in urine or stool.     Denies fevers, chills, nausea, vomiting, constipation, diarrhea. No abdominal pain. No chest pain/pressure, palpitations, or shortness of breath. No LE swelling. No neck pain. No oral or nasal sores.  No sicca symptoms. No photosensitivity or photophobia. No eye pain or redness. No history of inflammatory eye or bowel disease.      Tobacco: none  EtOH: 1 drink per week  Drugs: none  Occupation: Was a  at the [employer]; now working at an [employer] company    ROS   GEN: No fevers, chills, night sweats, or weight change  SKIN: See HPI  HEENT:  No oral or nasal ulcers.  See HPI  CV: No chest pain, pressure, palpitations, or dyspnea on exertion.  PULM: No SOB, wheeze, cough.   GI: No nausea, vomiting, constipation, diarrhea. No blood in stool. No abdominal pain.  MSK: See HPI.  NEURO: No numbness, tingling, or weakness.  EXT: No LE swelling  PSYCH: Negative    Active Problem List     Patient Active Problem List   Diagnosis     AS (ankylosing spondylitis) (H)     Acne vulgaris     Vegan diet     Past Medical History     Past Medical History:   Diagnosis Date     Acne      AS (ankylosing spondylitis) (H)      Dyspepsia      Past Surgical History     Past Surgical History:   Procedure Laterality Date     HEAD & NECK SURGERY      wisdom tooth extraction     ORTHOPEDIC SURGERY      3 arthroscopic knee surgery for ACL repair     Allergy     Allergies   Allergen Reactions     No Known Allergies      Current Medication List     Current Outpatient Medications   Medication Sig     adalimumab (HUMIRA *CF*) 40 MG/0.4ML  "prefilled syringe kit Inject 0.4 mLs (40 mg) Subcutaneous every 14 days . Hold for signs of infection, then seek medical attention.     naproxen sodium (ANAPROX) 220 MG tablet Take 1 tablet (220 mg) by mouth 2 times daily as needed for moderate pain     amoxicillin-clavulanate (AUGMENTIN) 875-125 MG tablet Take 1 tablet by mouth 2 times daily     No current facility-administered medications for this visit.      Social History   See HPI    Family History     Family History   Problem Relation Age of Onset     Family History Negative Mother      Cancer Father         skin cancer basal cell     Family History Negative Brother      Family History Negative Sister      Family History Negative Sister      Breast Cancer Maternal Aunt      No FHx of rheumatologic disease    Physical Exam     Temp Readings from Last 3 Encounters:   11/24/20 97.3  F (36.3  C) (Tympanic)   04/09/19 98.7  F (37.1  C) (Oral)   04/01/19 98.5  F (36.9  C) (Oral)     BP Readings from Last 5 Encounters:   11/24/20 120/76   01/29/20 137/75   04/09/19 137/82   04/01/19 116/80   04/10/18 127/79     Pulse Readings from Last 1 Encounters:   11/24/20 61     Resp Readings from Last 1 Encounters:   04/09/19 16     Estimated body mass index is 27.56 kg/m  as calculated from the following:    Height as of 11/24/20: 1.746 m (5' 8.74\").    Weight as of 11/24/20: 84 kg (185 lb 3.2 oz).    GEN: alert and no distress  PSYCH: Alert; coherent speech, normal rate and volume, able to articulate logical thoughts, able   to abstract reason, no tangential thoughts. Normal affect.   RESP: No cough, no audible wheezing, able to talk in full sentences  Remainder of exam unable to be completed due to telephone visits      Labs / Imaging (select studies)     CBC  Recent Labs   Lab Test 07/24/20  0826 01/29/20  1038 04/09/19  1328   WBC 5.5 6.6 7.2   RBC 4.71 4.65 4.89   HGB 14.8 14.1 15.1   HCT 40.7 40.0 41.9   MCV 86 86 86   RDW 11.9 11.9 11.7    235 258   MCH 31.4 30.3 " 30.9   MCHC 36.4 35.3 36.0   NEUTROPHIL 40.9 46.0 53.7   LYMPH 34.4 38.1 32.0   MONOCYTE 10.7 10.0 10.0   EOSINOPHIL 13.5 5.3 4.0   BASOPHIL 0.5 0.6 0.3   ANEU 2.3 3.0 3.9   ALYM 1.9 2.5 2.3   EPI 0.6 0.7 0.7   AEOS 0.7 0.4 0.3   ABAS 0.0 0.0 0.0     CMP  Recent Labs   Lab Test 11/24/20  1417 07/24/20  0826 01/29/20  1038 04/09/19  1328 03/24/17  0953 03/24/17  0953 03/08/17  1108   NA  --   --   --   --   --  139  --    POTASSIUM  --   --   --   --   --  4.1  --    CHLORIDE  --   --   --   --   --  104  --    CO2  --   --   --   --   --  29  --    ANIONGAP  --   --   --   --   --  6  --    GLC 69*  --   --   --   --  80 86   BUN  --   --   --   --   --  13  --    CR  --  0.93 0.84 1.04   < > 0.82  --    GFRESTIMATED  --  >90 >90 >90   < > >90  Non  GFR Calc    --    GFRESTBLACK  --  >90 >90 >90   < > >90  African American GFR Calc    --    UZMA  --   --   --   --   --  8.7  --    BILITOTAL  --  0.9 0.4 0.6   < > 0.5  --    ALBUMIN  --  4.4 4.5 4.6   < > 4.3  --    PROTTOTAL  --  8.1 7.8 8.1   < > 7.6  --    ALKPHOS  --  63 54 64   < > 71  --    AST  --  29 22 23   < > 24  --    ALT  --  35 24 31   < > 29  --     < > = values in this interval not displayed.     Calcium/VitaminD  Recent Labs   Lab Test 03/24/17  0953   UZMA 8.7     ESR/CRP  Recent Labs   Lab Test 07/24/20  0826 01/29/20  1038 04/09/19  1328   SED 5 5 7   CRP <2.9 <2.9 <2.9     Tuberculosis Screening  Recent Labs   Lab Test 01/29/20  1038 10/10/17  1359   TBRES Negative  --    TBRSLT  --  Negative   TBAGN  --  0.00     Immunization History     Immunization History   Administered Date(s) Administered     Influenza Vaccine IM > 6 months Valent IIV4 10/10/2017, 01/29/2020, 11/24/2020     Pneumo Conj 13-V (2010&after) 01/21/2016     Pneumococcal 23 valent 10/10/2017     Tdap (Adacel,Boostrix) 04/27/2016          Chart documentation done in part with Dragon Voice recognition Software. Although reviewed after completion, some word and  grammatical error may remain.    Phone call duration with patient (in minutes): 9    Location of patient: [place], [place]   Location of provider: jillian Middleton MD

## 2021-02-15 ENCOUNTER — MYC MEDICAL ADVICE (OUTPATIENT)
Dept: FAMILY MEDICINE | Facility: CLINIC | Age: 31
End: 2021-02-15

## 2021-02-16 ENCOUNTER — VIRTUAL VISIT (OUTPATIENT)
Dept: FAMILY MEDICINE | Facility: CLINIC | Age: 31
End: 2021-02-16
Payer: COMMERCIAL

## 2021-02-16 DIAGNOSIS — J32.0 CHRONIC MAXILLARY SINUSITIS: Primary | ICD-10-CM

## 2021-02-16 PROCEDURE — 99213 OFFICE O/P EST LOW 20 MIN: CPT | Mod: 95 | Performed by: PHYSICIAN ASSISTANT

## 2021-02-16 RX ORDER — CEFDINIR 300 MG/1
300 CAPSULE ORAL 2 TIMES DAILY
Qty: 42 CAPSULE | Refills: 0 | Status: SHIPPED | OUTPATIENT
Start: 2021-02-16 | End: 2021-03-09

## 2021-02-16 NOTE — PROGRESS NOTES
Dayday is a 30 year old who is being evaluated via a billable video visit.      How would you like to obtain your AVS? MyChart  If the video visit is dropped, the invitation should be resent by: Send to e-mail at: [email or web address]  Will anyone else be joining your video visit? No    Video Start Time: 1140 AM     Assessment & Plan     Chronic maxillary sinusitis  4 months. Improves on antibiotics but does not clear  Trial alternative antibiotics x 3 weeks  Recommend hot pack and sinus flush  See ENT after a CT scan IF this doesn't resolve  This prescription is given with a discussion of side effects, risks and proper use.  Instructions are given to follow up if not improving or symptoms change or worsen as discussed.   - cefdinir (OMNICEF) 300 MG capsule; Take 1 capsule (300 mg) by mouth 2 times daily for 21 days    No follow-ups on file.    JAN MOYA [facility]   Dayday is a 30 year old who presents for the following health issues     HPI       Acute Illness  Acute illness concerns: congestion and sinus problem  Onset/Duration: 10-11 months ago  Symptoms:  Fever: no  Chills/Sweats: no  Headache (location?): no  Sinus Pressure: YES  Conjunctivitis:  no  Ear Pain: no  Rhinorrhea: no  Congestion: YES  Sore Throat: no  Cough: no  Wheeze: no  Decreased Appetite: no  Nausea: no  Vomiting: no  Diarrhea: no  Dysuria/Freq.: no  Dysuria or Hematuria: no  Fatigue/Achiness: no  Sick/Strep Exposure: no  Therapies tried and outcome: last year 5-6 months ago patient did see rhuematologist and to try OTC allergy medication, Claritin and flonase for 2 months and then on and off, then antibiotics in November. Recently been taking mucinex.     Patient did take some antiinflammatory meds naproxen for dental procedure.     Review of Systems   Constitutional, HEENT, cardiovascular, pulmonary, gi and gu systems are negative, except as otherwise noted.      Objective            Vitals:  No vitals were obtained today due to virtual visit.    Physical Exam   GENERAL: Healthy, alert and no distress  EYES: Eyes grossly normal to inspection.  No discharge or erythema, or obvious scleral/conjunctival abnormalities.  RESP: No audible wheeze, cough, or visible cyanosis.  No visible retractions or increased work of breathing.    SKIN: Visible skin clear. No significant rash, abnormal pigmentation or lesions.  NEURO: Cranial nerves grossly intact.  Mentation and speech appropriate for age.  PSYCH: Mentation appears normal, affect normal/bright, judgement and insight intact, normal speech and appearance well-groomed.           Video-Visit Details    Type of service:  Video Visit    Video End Time:11:55 AM    Originating Location (pt. Location): Home    Distant Location (provider location):  [facility]     Platform used for Video Visit: [employer]

## 2021-03-22 ENCOUNTER — E-VISIT (OUTPATIENT)
Dept: FAMILY MEDICINE | Facility: CLINIC | Age: 31
End: 2021-03-22
Payer: COMMERCIAL

## 2021-03-22 DIAGNOSIS — J32.0 CHRONIC MAXILLARY SINUSITIS: Primary | ICD-10-CM

## 2021-03-22 PROCEDURE — 99421 OL DIG E/M SVC 5-10 MIN: CPT | Performed by: PHYSICIAN ASSISTANT

## 2021-03-23 ENCOUNTER — HOSPITAL ENCOUNTER (OUTPATIENT)
Dept: CT IMAGING | Facility: CLINIC | Age: 31
Discharge: HOME | End: 2021-03-23
Attending: PHYSICIAN ASSISTANT | Admitting: PHYSICIAN ASSISTANT
Payer: COMMERCIAL

## 2021-03-23 DIAGNOSIS — J32.0 CHRONIC MAXILLARY SINUSITIS: ICD-10-CM

## 2021-03-23 PROCEDURE — 70486 CT MAXILLOFACIAL W/O DYE: CPT

## 2021-04-26 ENCOUNTER — IMMUNIZATION (OUTPATIENT)
Dept: NURSING | Facility: CLINIC | Age: 31
End: 2021-04-26
Payer: COMMERCIAL

## 2021-04-26 PROCEDURE — 91300 PR COVID VAC PFIZER DIL RECON 30 MCG/0.3 ML IM: CPT

## 2021-04-26 PROCEDURE — 0001A PR COVID VAC PFIZER DIL RECON 30 MCG/0.3 ML IM: CPT

## 2021-05-17 ENCOUNTER — IMMUNIZATION (OUTPATIENT)
Dept: NURSING | Facility: CLINIC | Age: 31
End: 2021-05-17
Attending: INTERNAL MEDICINE
Payer: COMMERCIAL

## 2021-05-17 PROCEDURE — 91300 PR COVID VAC PFIZER DIL RECON 30 MCG/0.3 ML IM: CPT

## 2021-05-17 PROCEDURE — 0002A PR COVID VAC PFIZER DIL RECON 30 MCG/0.3 ML IM: CPT

## 2021-06-25 NOTE — TELEPHONE ENCOUNTER
Prior authorization has been approved.   [facility]  received a request from your prescriber for coverage of Humira for  you.  As long as you remain covered by your prescription drug plan and there are no  changes to your plan benefits, this request is approved for the following time period:  02/02/2018 02/02/2020  Saba Pinto MA    
Sent PA to cover my meds on 2/2/18, please check for additional questions.   Saba Pinto MA    
25-Jun-2021 22:28

## 2021-07-16 DIAGNOSIS — M45.7 ANKYLOSING SPONDYLITIS OF LUMBOSACRAL REGION (H): ICD-10-CM

## 2021-07-16 NOTE — TELEPHONE ENCOUNTER
Called patient who said he has enough medication to last him until follow up visit on 7/26/21.    Karan DENNEY RN....7/16/2021 12:28 PM

## 2021-07-26 ENCOUNTER — LAB (OUTPATIENT)
Dept: LAB | Facility: CLINIC | Age: 31
End: 2021-07-26

## 2021-07-26 ENCOUNTER — OFFICE VISIT (OUTPATIENT)
Dept: RHEUMATOLOGY | Facility: CLINIC | Age: 31
End: 2021-07-26
Payer: COMMERCIAL

## 2021-07-26 VITALS
HEIGHT: 68 IN | DIASTOLIC BLOOD PRESSURE: 84 MMHG | BODY MASS INDEX: 28.04 KG/M2 | SYSTOLIC BLOOD PRESSURE: 134 MMHG | OXYGEN SATURATION: 97 % | HEART RATE: 58 BPM | WEIGHT: 185 LBS

## 2021-07-26 DIAGNOSIS — M45.7 ANKYLOSING SPONDYLITIS OF LUMBOSACRAL REGION (H): Primary | ICD-10-CM

## 2021-07-26 DIAGNOSIS — Z79.899 HIGH RISK MEDICATION USE: ICD-10-CM

## 2021-07-26 DIAGNOSIS — M45.7 ANKYLOSING SPONDYLITIS OF LUMBOSACRAL REGION (H): ICD-10-CM

## 2021-07-26 LAB
ALBUMIN SERPL-MCNC: 4.4 G/DL (ref 3.4–5)
ALP SERPL-CCNC: 55 U/L (ref 40–150)
ALT SERPL W P-5'-P-CCNC: 31 U/L (ref 0–70)
AST SERPL W P-5'-P-CCNC: 25 U/L (ref 0–45)
BASOPHILS # BLD AUTO: 0 10E3/UL (ref 0–0.2)
BASOPHILS NFR BLD AUTO: 0 %
BILIRUB DIRECT SERPL-MCNC: 0.1 MG/DL (ref 0–0.2)
BILIRUB SERPL-MCNC: 0.6 MG/DL (ref 0.2–1.3)
CREAT SERPL-MCNC: 0.78 MG/DL (ref 0.66–1.25)
CRP SERPL-MCNC: <2.9 MG/L (ref 0–8)
EOSINOPHIL # BLD AUTO: 0.4 10E3/UL (ref 0–0.7)
EOSINOPHIL NFR BLD AUTO: 7 %
ERYTHROCYTE [DISTWIDTH] IN BLOOD BY AUTOMATED COUNT: 11.8 % (ref 10–15)
ERYTHROCYTE [SEDIMENTATION RATE] IN BLOOD BY WESTERGREN METHOD: 5 MM/HR (ref 0–15)
GFR SERPL CREATININE-BSD FRML MDRD: >90 ML/MIN/1.73M2
HBV CORE AB SERPL QL IA: NONREACTIVE
HBV SURFACE AG SERPL QL IA: NONREACTIVE
HCT VFR BLD AUTO: 40.7 % (ref 40–53)
HCV AB SERPL QL IA: NONREACTIVE
HGB BLD-MCNC: 14.5 G/DL (ref 13.3–17.7)
LYMPHOCYTES # BLD AUTO: 2.1 10E3/UL (ref 0.8–5.3)
LYMPHOCYTES NFR BLD AUTO: 36 %
MCH RBC QN AUTO: 30.9 PG (ref 26.5–33)
MCHC RBC AUTO-ENTMCNC: 35.6 G/DL (ref 31.5–36.5)
MCV RBC AUTO: 87 FL (ref 78–100)
MONOCYTES # BLD AUTO: 0.7 10E3/UL (ref 0–1.3)
MONOCYTES NFR BLD AUTO: 11 %
NEUTROPHILS # BLD AUTO: 2.7 10E3/UL (ref 1.6–8.3)
NEUTROPHILS NFR BLD AUTO: 45 %
PLATELET # BLD AUTO: 231 10E3/UL (ref 150–450)
PROT SERPL-MCNC: 7.4 G/DL (ref 6.8–8.8)
RBC # BLD AUTO: 4.7 10E6/UL (ref 4.4–5.9)
WBC # BLD AUTO: 5.9 10E3/UL (ref 4–11)

## 2021-07-26 PROCEDURE — 80076 HEPATIC FUNCTION PANEL: CPT

## 2021-07-26 PROCEDURE — 99214 OFFICE O/P EST MOD 30 MIN: CPT | Performed by: INTERNAL MEDICINE

## 2021-07-26 PROCEDURE — 85025 COMPLETE CBC W/AUTO DIFF WBC: CPT

## 2021-07-26 PROCEDURE — 36415 COLL VENOUS BLD VENIPUNCTURE: CPT

## 2021-07-26 PROCEDURE — 86140 C-REACTIVE PROTEIN: CPT

## 2021-07-26 PROCEDURE — 85652 RBC SED RATE AUTOMATED: CPT

## 2021-07-26 PROCEDURE — 87340 HEPATITIS B SURFACE AG IA: CPT

## 2021-07-26 PROCEDURE — 86704 HEP B CORE ANTIBODY TOTAL: CPT

## 2021-07-26 PROCEDURE — 86481 TB AG RESPONSE T-CELL SUSP: CPT

## 2021-07-26 PROCEDURE — 86803 HEPATITIS C AB TEST: CPT

## 2021-07-26 PROCEDURE — 82565 ASSAY OF CREATININE: CPT

## 2021-07-26 ASSESSMENT — MIFFLIN-ST. JEOR: SCORE: 1768.65

## 2021-07-26 NOTE — NURSING NOTE
RAPID3 (0-30) Cumulative Score  3          RAPID3 Weighted Score (divide #4 by 3 and that is the weighted score)  1

## 2021-07-26 NOTE — PROGRESS NOTES
Rheumatology Clinic Visit      Dayday Buck MRN# [id number]   YOB: 1990 Age: 30 year old      Date of visit: 7/26/21   PCP: Lamin Zuniga    Chief Complaint   Patient presents with:  RECHECK    Assessment and Plan     1. Ankylosing Spondylitis: Diagnosed in 2014 by Dr. Tammi Murrieta, rheumatologist in [place], [place].  8/22/2014 [facility] MRI of the right hip showed sacroiliitis.  He has been on Humira since 2014 with resolution of his initial symptoms that included pain and stiffness of his bilateral hips, lower back, and neck.  Compliance with Humira has improved to 70%, per patient; sometimes he forgets to take it until week #3 or 4; compliance has improved with having his wife help with reordering medication.   Chronic illness, stable.    - Continue Humira 40mg SQ every 14 days   - Naproxen 220mg BID PRN; purchases OTC  - Labs now: CBC, Creatinine, Hepatic Panel, ESR, CRP, hepatitis B/C, QuantiFERON-TB gold plus    2.  Vaccinations: Vaccinations reviewed with Mr. Buck.  Risks and benefits of vaccinations were discussed.   CDC stance on shingrix when on moderate to high immunosuppression was reviewed.   I explained that Shingrix is used off label when under 50 years old and that the safety and efficacy of the vaccine has not been tested in people younger than 50 years old.   - Influenza: encouraged yearly vaccination  - [id number]: up to date  - [id number]: up to date  - Shingrix: Advised receiving; patient is going to check on insurance coverage before determining if it is going to be received  - COVID-19: has received the Pfizer COVID-19 vaccine on 4/26/2021 and 5/17/2021    Total minutes spent in evaluation with patient, documentation, , and review of pertinent studies and chart notes: 22       Mr. Buck verbalized agreement with and understanding of the rational for the diagnosis and treatment plan.  All questions were answered to best of my ability and the  patient's satisfaction. Mr. Buck was advised to contact the clinic with any questions that may arise after the clinic visit.      Thank you for involving me in the care of the patient    Return to clinic: 6 months      HPI   Dayday Buck is a 30 year old male with a past medical history significant for ankylosing spondylitis who is seen for f/u of ankylosing spondylitis.    10/7/2016 rheumatology clinic note by Dr. Tammi Murrieta in [place], [place], documents HLA-B27 positive, sacroiliitis on MRI.  On Humira, naproxen PRN. Ankylosing Spondylitis.      2014 right hip MRI showed sacroiliitis    4/9/2019: Mr. Buck reported that he was doing great on Humira but he lost his job and insurance in December 2018 so has been off Humira since then.  He did well for about 2 months but then on month 2-4 he has noticed an increased use of naproxen.  He now uses naproxen 220-440 mg twice daily that is helpful for his lower back pain and stiffness.  Morning stiffness for about 1 hour that improves with activity and time and NSAIDs.  He would like to restart Humira.      10/8/2019: no showed to scheduled clinic visit    1/29/2020: doing well when taking humira every 2 weeks, but often late by 1-2 weeks and symptoms return when late.  Mostly back issues when more symptomatic, and morning stiffness for about 1 hour. Says that he will be better about taking Humira with the help from phone reminders and his wife.     7/29/2020: doing better with taking Humira consistently; every 14 days.  Using naproxen 220mg BID PRN, about 2 days per week. More symptomatic for about 1 day prior to his dose of Humira, but very mildly increased achiness he says. Generally the achiness is just related to exercise.  He also notes having seasonal allergies, nasal congestion, and a postnasal drip but no cough.  He wonders what he should do for his allergies.    1/26/2021: dental work (crown) in Nov 2020 with pain since then and using naproxen for the tooth pain;  hasn't followed up with his dentist.  AS is doing well without pain or stiffness.  Tolerating naproxen and Humira well. No GERD symptoms. No blood in urine or stool.     Today, 7/26/2021: Doing well at this time.  No joint pain or morning stiffness.  70% of the time he is taking Humira every 2 weeks, but occasionally he may take it on week #3 or week #4.  When he delays Humira sometimes he feels more achy and stiff.  As long as he takes Humira every 2 weeks and he has no joint pain or stiffness.  No joint swelling.  Has had some sinus congestion and is taking Claritin and Flonase at the direction of his PCP.    Denies fevers, chills, nausea, vomiting, constipation, diarrhea. No abdominal pain. No chest pain/pressure, palpitations, or shortness of breath. No LE swelling. No neck pain. No oral or nasal sores.  No sicca symptoms. No photosensitivity or photophobia. No eye pain or redness. No history of inflammatory eye or bowel disease.      Tobacco: none  EtOH: 1 drink per week  Drugs: none  Occupation: Was a  at the [employer]; now working at an [employer] company    ROS   12 point review of system was completed and negative except as noted in the HPI     Active Problem List     Patient Active Problem List   Diagnosis     AS (ankylosing spondylitis) (H)     Acne vulgaris     Vegan diet     Past Medical History     Past Medical History:   Diagnosis Date     Acne      AS (ankylosing spondylitis) (H)      Dyspepsia      Past Surgical History     Past Surgical History:   Procedure Laterality Date     HEAD & NECK SURGERY      wisdom tooth extraction     ORTHOPEDIC SURGERY      3 arthroscopic knee surgery for ACL repair     Allergy     Allergies   Allergen Reactions     No Known Allergies      Current Medication List     Current Outpatient Medications   Medication Sig     adalimumab (HUMIRA *CF*) 40 MG/0.4ML prefilled syringe kit Inject 0.4 mLs (40 mg) Subcutaneous every 14 days . Hold for signs of  "infection, then seek medical attention.     fluticasone (FLONASE) 50 MCG/ACT nasal spray Spray 2 sprays into both nostrils 2 times daily     naproxen sodium (ANAPROX) 220 MG tablet Take 1 tablet (220 mg) by mouth 2 times daily as needed for moderate pain     No current facility-administered medications for this visit.     Social History   See HPI    Family History     Family History   Problem Relation Age of Onset     Family History Negative Mother      Cancer Father         skin cancer basal cell     Family History Negative Brother      Family History Negative Sister      Family History Negative Sister      Breast Cancer Maternal Aunt      No FHx of rheumatologic disease    Physical Exam     Temp Readings from Last 3 Encounters:   11/24/20 97.3  F (36.3  C) (Tympanic)   04/09/19 98.7  F (37.1  C) (Oral)   04/01/19 98.5  F (36.9  C) (Oral)     BP Readings from Last 5 Encounters:   07/26/21 134/84   11/24/20 120/76   01/29/20 137/75   04/09/19 137/82   04/01/19 116/80     Pulse Readings from Last 1 Encounters:   07/26/21 58     Resp Readings from Last 1 Encounters:   04/09/19 16     Estimated body mass index is 28.13 kg/m  as calculated from the following:    Height as of this encounter: 1.727 m (5' 8\").    Weight as of this encounter: 83.9 kg (185 lb).      GEN: NAD. Healthy appearing adult.   HEENT:  Anicteric, noninjected sclera. No obvious external lesions of the ear and nose. Hearing intact.  CV: S1, S2. RRR. No m/r/g  PULM: No increased work of breathing. CTA bilaterally   MSK: MCPs, PIPs, DIPs without swelling or tenderness to palpation.  Wrists without swelling or tenderness to palpation.  Elbows and shoulders without swelling or tenderness to palpation.    Knees, ankles, and MTPs without swelling or tenderness to palpation.  Able to touch toes from a standing position without bending his knees.  Heel and occiput to wall without difficulty.  SKIN: No rash or jaundice seen  PSYCH: Alert. Appropriate.    "     Labs / Imaging (select studies)       CBC  Recent Labs   Lab Test 07/24/20  0826 01/29/20  1038 04/09/19  1328   WBC 5.5 6.6 7.2   RBC 4.71 4.65 4.89   HGB 14.8 14.1 15.1   HCT 40.7 40.0 41.9   MCV 86 86 86   RDW 11.9 11.9 11.7    235 258   MCH 31.4 30.3 30.9   MCHC 36.4 35.3 36.0   NEUTROPHIL 40.9 46.0 53.7   LYMPH 34.4 38.1 32.0   MONOCYTE 10.7 10.0 10.0   EOSINOPHIL 13.5 5.3 4.0   BASOPHIL 0.5 0.6 0.3   ANEU 2.3 3.0 3.9   ALYM 1.9 2.5 2.3   EPI 0.6 0.7 0.7   AEOS 0.7 0.4 0.3   ABAS 0.0 0.0 0.0     CMP  Recent Labs   Lab Test 11/24/20  1417 07/24/20  0826 01/29/20  1038 04/09/19  1328 03/24/17  0953 03/08/17  1108   NA  --   --   --   --  139  --    POTASSIUM  --   --   --   --  4.1  --    CHLORIDE  --   --   --   --  104  --    CO2  --   --   --   --  29  --    ANIONGAP  --   --   --   --  6  --    GLC 69*  --   --   --  80 86   BUN  --   --   --   --  13  --    CR  --  0.93 0.84 1.04 0.82  --    GFRESTIMATED  --  >90 >90 >90 >90  Non African American GFR Calc    --    GFRESTBLACK  --  >90 >90 >90 >90  African American GFR Calc    --    UZMA  --   --   --   --  8.7  --    BILITOTAL  --  0.9 0.4 0.6 0.5  --    ALBUMIN  --  4.4 4.5 4.6 4.3  --    PROTTOTAL  --  8.1 7.8 8.1 7.6  --    ALKPHOS  --  63 54 64 71  --    AST  --  29 22 23 24  --    ALT  --  35 24 31 29  --      Calcium/VitaminD  Recent Labs   Lab Test 03/24/17  0953   UZMA 8.7     ESR/CRP  Recent Labs   Lab Test 07/24/20  0826 01/29/20  1038 04/09/19  1328   SED 5 5 7   CRP <2.9 <2.9 <2.9     Tuberculosis Screening  Recent Labs   Lab Test 01/29/20  1038 10/10/17  1359   TBRES Negative  --    TBRSLT  --  Negative   TBAGN  --  0.00       Immunization History     Immunization History   Administered Date(s) Administered     COVID-19,PF,Pfizer 04/26/2021, 05/17/2021     Influenza Vaccine IM > 6 months Valent IIV4 10/10/2017, 01/29/2020, 11/24/2020     Pneumo Conj 13-V (2010&after) 01/21/2016     Pneumococcal 23 valent 10/10/2017     Tdap  (Adacel,Boostrix) 04/27/2016          Chart documentation done in part with Dragon Voice recognition Software. Although reviewed after completion, some word and grammatical error may remain.    Bobby Middleton MD

## 2021-07-28 LAB
QUANTIFERON MITOGEN: 10 IU/ML
QUANTIFERON NIL TUBE: 0.05 IU/ML
QUANTIFERON TB1 TUBE: 0.07 IU/ML
QUANTIFERON TB2 TUBE: 0.08

## 2021-07-30 LAB
GAMMA INTERFERON BACKGROUND BLD IA-ACNC: 0.05 IU/ML
M TB IFN-G BLD-IMP: NEGATIVE
M TB IFN-G CD4+ BCKGRND COR BLD-ACNC: 9.95 IU/ML
MITOGEN IGNF BCKGRD COR BLD-ACNC: 0.02 IU/ML
MITOGEN IGNF BCKGRD COR BLD-ACNC: 0.03 IU/ML

## 2021-10-09 ENCOUNTER — HEALTH MAINTENANCE LETTER (OUTPATIENT)
Age: 31
End: 2021-10-09

## 2021-11-02 ENCOUNTER — OFFICE VISIT (OUTPATIENT)
Dept: ALLERGY | Facility: CLINIC | Age: 31
End: 2021-11-02
Payer: COMMERCIAL

## 2021-11-02 VITALS
OXYGEN SATURATION: 98 % | SYSTOLIC BLOOD PRESSURE: 142 MMHG | BODY MASS INDEX: 30.23 KG/M2 | WEIGHT: 198.8 LBS | DIASTOLIC BLOOD PRESSURE: 92 MMHG | HEART RATE: 69 BPM

## 2021-11-02 DIAGNOSIS — J31.0 NONALLERGIC RHINITIS: Primary | ICD-10-CM

## 2021-11-02 DIAGNOSIS — R09.81 NASAL CONGESTION: ICD-10-CM

## 2021-11-02 PROCEDURE — 95004 PERQ TESTS W/ALRGNC XTRCS: CPT | Performed by: ALLERGY & IMMUNOLOGY

## 2021-11-02 PROCEDURE — 99204 OFFICE O/P NEW MOD 45 MIN: CPT | Mod: 25 | Performed by: ALLERGY & IMMUNOLOGY

## 2021-11-02 RX ORDER — AZELASTINE 1 MG/ML
2 SPRAY, METERED NASAL 2 TIMES DAILY
Qty: 30 ML | Refills: 1 | Status: SHIPPED | OUTPATIENT
Start: 2021-11-02 | End: 2022-01-27

## 2021-11-02 RX ORDER — OXYMETAZOLINE HYDROCHLORIDE 0.05 G/100ML
2 SPRAY NASAL 2 TIMES DAILY
COMMUNITY

## 2021-11-02 NOTE — PROGRESS NOTES
Per provider verbal order, placed Adult Environmental Panel scratch test.  Consent was obtained prior to procedure.  Once panels were placed, patient was monitored for 15 minutes in clinic.  Provider read test after 15 minutes..  Pt tolerated procedure well.  All questions and concerns were addressed at office visit.     Manuela Mercedes, EVANGELISTN, RN

## 2021-11-02 NOTE — PROGRESS NOTES
Dayday Buck was seen in the Allergy Clinic at [facility].    Dayday Buck is a 31 year old White male being seen today in consultation for allergies.    He reports that in March of 2020 he started having daily nasal congestion that made it difficult to breathe. He was sometimes able to blow his nose and produce clear mucous. Occasionally pale green or yellow in color. No fevers. More recently he started noticing drainage to the back of his throat that worsens at night and keeps him awake. He does not have any sneezing or coughing. No eye watering, itching, or redness. He does not notice any pattern of worsening congestion with seasonal changes, being indoors or outdoors, being in different parts of his house, being around his three dogs, or traveling to other states. He can smell and taste normally. A few months before his congestion started he moved to an older house and worked on renovation including building a fence where he inhaled sawdust; he also got a new dog around the same time.     Since his symptoms started, he has tried daily claritin and daily dlonase for 1-3 months at a time. He has not noticed any improvement with these medications. He last used claritin and flonase about 3 months ago. He was treated with an antibiotic for a suspected sinus infection and did not improve, so a CT of the sinuses was done. He was then treated with two more courses of antibiotics with no improvement. No fevers.     About two months ago he started using afrin as needed, every few days for up to one week at a time. Last used it yesterday. He feels like this is the only medication that has improved his congestion.     He is on humira for ankylosing spondylitis. No history of asthma. Dad with seasonal allergies and mom with history of frequent sinus infections.       Sinus CT 3/23/21:  FINDINGS:   Frontal sinuses: Aerated secretions within the inferior left frontal  sinus. Frontal recesses are patent.       Ethmoid sinuses: Mild mucosal thickening.      Right maxillary sinus: Mild mucosal thickening with aerated  secretions. The ostiomeatal unit is normal with a patent infundibulum.        Left maxillary sinus: Mild mucosal thickening with aerated secretions.  The ostiomeatal unit is normal with a patent infundibulum.      Sphenoid sinus: No significant mucosal thickening. Near midline.      Nasal septum: Normal and in the midline.      Turbinates and nasal cavity: No nasal mucosal thickening. The  turbinates are unremarkable.      Laminae papyracea and cribriform plate: Unremarkable.     Other findings: Low mAs images of the brain are unremarkable. No  suspicious lucencies around the visualized teeth.                                                               IMPRESSION:   1. Mild mucosal thickening with scattered aerated secretions.  2. Patent drainage pathways.    Past Medical History:   Diagnosis Date     Acne      AS (ankylosing spondylitis) (H)      Dyspepsia      Family History   Problem Relation Age of Onset     Family History Negative Mother      Cancer Father         skin cancer basal cell     Family History Negative Brother      Family History Negative Sister      Family History Negative Sister      Breast Cancer Maternal Aunt      Past Surgical History:   Procedure Laterality Date     HEAD & NECK SURGERY      wisdom tooth extraction     ORTHOPEDIC SURGERY      3 arthroscopic knee surgery for ACL repair       ENVIRONMENTAL HISTORY:   [place]day [place] The home is heated with a forced air. They do have central air conditioning. The patient's bedroom is furnished with Indoor plants, hard edison in bedroom and fabric window coverings.  Pets inside the house include 2 dog(s). There is no history of cockroach or mice infestation. Do you smoke cigarettes or other recreational drugs? No Do you vape or use an e-cigarette? No. There is/are 0 smokers living in the house. There  is/are 0 who smoke ecigarettes/vape living in the house. The house does not have a damp basement.     SOCIAL HISTORY:   Dayday is employed as . He lives with his spouse and child.  His spouse works in marketing.    REVIEW OF SYSTEMS:  General: negative for weight gain. negative for weight loss. negative for changes in sleep.   Eyes: negative for itching. negative for redness. negative for tearing/watering. negative for vision changes. Positive for dry eyelids  Ears: negative for fullness. negative for hearing loss. negative for dizziness.   Nose: negative for snoring.negative for changes in smell. positive  for drainage. Positive for congestion.  Throat: negative for hoarseness. negative for sore throat. negative for trouble swallowing.   Lungs: negative for cough. negative for shortness of breath.negative for wheezing. negative for sputum production.   Cardiovascular: negative for chest pain. negative for swelling of ankles. negative for fast or irregular heartbeat.   Gastrointestinal: negative for nausea. negative for heartburn. negative for acid reflux.   Musculoskeletal: negative for joint pain. negative for joint stiffness. negative for joint swelling.   Neurologic: negative for seizures. negative for fainting. negative for weakness.   Psychiatric: negative for changes in mood. negative for anxiety.   Endocrine: negative for cold intolerance. negative for heat intolerance. negative for tremors.   Hematologic: negative for easy bruising. negative for easy bleeding.  Integumentary: negative for rash. negative for scaling. negative for nail changes.       Current Outpatient Medications:      adalimumab (HUMIRA *CF*) 40 MG/0.4ML prefilled syringe kit, Inject 0.4 mLs (40 mg) Subcutaneous every 14 days . Hold for signs of infection, then seek medical attention., Disp: 2.4 mL, Rfl: 2     fluticasone (FLONASE) 50 MCG/ACT nasal spray, Spray 2 sprays into both nostrils 2 times daily, Disp: 16 g, Rfl: 2      naproxen sodium (ANAPROX) 220 MG tablet, Take 1 tablet (220 mg) by mouth 2 times daily as needed for moderate pain, Disp: , Rfl:   Immunization History   Administered Date(s) Administered     COVID-SHO [place],Pfizer (12+ Yrs) 04/26/2021, 05/17/2021     Influenza Vaccine IM > 6 months Valent IIV4 (Alfuria,Fluzone) 10/10/2017, 01/29/2020, 11/24/2020     Pneumo Conj 13-V (2010&after) 01/21/2016     Pneumococcal 23 valent 10/10/2017     Tdap (Adacel,Boostrix) 04/27/2016     Allergies   Allergen Reactions     No Known Allergies          EXAM:   There were no vitals taken for this visit.  GENERAL APPEARANCE: alert, interactive, well-appearing and not in distress   SKIN: no rashes or lesions on examined skin  HEAD: normocephalic, atraumatic  EYES: conjunctivae and sclerae clear  ENT: no scars or lesions, midline dorsum, nasal exam showed no discharge, swelling or lesions noted  NECK: no asymmetry, masses, or scars  LUNGS: normal work of breathing on room air, clear to auscultation, no cough or wheezing  HEART: extremities warm and well-perfused; regular rate and rhythm, no murmurs/gallops/rubs  MUSCULOSKELETAL: no musculoskeletal defects are noted  NEURO: no focal deficits noted  PSYCH: age appropriate mood/affect    WORKUP: Skin testing    ENVIRONMENTAL PERCUTANEOUS SKIN TESTING: ADULT  [facility] 11/2/2021   Consent Y   Ordering Physician Dr. Chow   Interpreting Physician Dr. Chow   Testing Technician Manuela BAUMANN RN   Location Back   Time start:  9:07 AM   Time End:  9:22 AM   Positive Control: Histatrol*ALK 1 mg/ml 7/31   Negative Control: 50% Glycerin 0   Cat Hair*ALK (10,000 BAU/ml) 0   AP Dog Hair/Dander (1:100 w/v) 0   Dust Mite p. 30,000 AU/ml 0   Dust Mite f. (30,000 AU/ml) 0   Tenzin (W/F in millimeters) 0   David Grass (100,000 BAU/mL) 0   Red Cedar (W/F in millimeters) 0   Maple/[place] (W/F in millimeters) 0   Hackberry (W/F in millimeters) 0   [place] (W/F in millimeters) 0   [place] *ALK (W/F  in millimeters) 0   American Elm (W/F in millimeters) 0   [place] (W/F in millimeters) 0   [place] (W/F in millimeters) 0   Birch Mix (W/F in millimeters) 0   [place] (W/F in millimeters) 0   Oak (W/F in millimeters) 0   Cocklebur (W/F in millimeters) 0   [place] (W/F in millimeters) 0   White Par[place] (W/F in millimeters) 0   Careless (W/F in millimeters) 0   Nettle (W/F in millimeters) 0   English Plantain (W/F in millimeters) 0   Kochia (W/F in millimeters) 0   Lamb's Quarter (W/F in millimeters) 0   Marshelder (W/F in millimeters) 0   Ragweed Mix* ALK (W/F in millimeters) 0   Russian Thistle (W/F in millimeters) 0   Sagebrush/Mugwort (W/F in millimeters) 0   Sheep Sorrel (W/F in millimeters) 0   Feather Mix* ALK (W/F in millimeters) 0   Penicillium Mix (1:10 w/v) 0   Curvularia spicifera (1:10 w/v) 0   Epicoccum (1:10 w/v) 0   Aspergillus fumigatus (1:10 w/v): 0   Alternaria tenius (1:10 w/v) 0   H. Cladosporium (1:10 w/v) 0   Phoma herbarum (1:10 w/v) 0      Appropriate response to controls, all others negative    ASSESSMENT/PLAN:  Dayday Buck is a 31 year old male with chronic nasal congestion, found on CT to have mucosal thickening, that has not responded to antihistamines, nasal steroid sprays, or multiple courses of antibiotics. He does not have other nasal or ocular allergic symptoms, and does not report any clear triggers, seasonal patterns or exacerbating factors. Allergy skin testing was performed today for environmental and pet allergens and was negative. We discussed that afrin can cause rebound congestion and worsen his symptoms, and he is agreeable to stopping afrin, and doing a trial of a nasal antihistamine spray (azelastine), which should address congestion more than oral antihistamine use. His congestion may have a structural etiology, and we discussed following up with ENT in a few weeks to discuss possible interventions if trial of azelastine fails to improve his symptoms.     Nonallergic  rhinitis  Nasal congestion    - start azelastine nasal spray 2 sprays in each nostril twice a day for at least two weeks  - ENT referral placed  - recommend follow up with ENT if symptoms do not improve with nasal spray  - stop afrin nasal spray    Follow-up as needed.      The patient was seen and discussed with Dr. Chow.  Felipe Allen MD  Pediatric Resident PGY-1      Physician Attestation   I, Ck Chow MD, saw this patient and agree with the findings and plan of care as documented in the note.      1. Nonallergic rhinitis - Skin testing was negative for evidence of aeroallergen sensitization. Dayday was counseled that frequent use of nasal decongestant medications may lead to worsening of congestion over time. These medications should be used for brief periods of time to manage acute symptoms.    - discontinue Afrin nasal spray  - azelastine (ASTELIN) 0.1 % nasal spray; Spray 2 sprays into both nostrils 2 times daily  Dispense: 30 mL; Refill: 1  - Otolaryngology Referral; Future  - ALLERGY SKIN TESTS,ALLERGENS    2. Nasal congestion    - azelastine (ASTELIN) 0.1 % nasal spray; Spray 2 sprays into both nostrils 2 times daily  Dispense: 30 mL; Refill: 1  - Otolaryngology Referral; Future  - ALLERGY SKIN TESTS,ALLERGENS      Ck Chow MD, FAAAAI  Allergy/Immunology  [facility]      Chart documentation done in part with Dragon Voice Recognition Software. Although reviewed after completion, some word and grammatical errors may remain.

## 2021-11-02 NOTE — PATIENT INSTRUCTIONS
If you have any questions regarding your allergies, asthma, or what we discussed during your visit today please call the allergy clinic or contact us via [employer].    [facility] Allergy RN Line: [phone] - call this number with any questions during or after business/clinic hours  [facility] Scheduling Line: [phone]  [facility] Scheduling Line: [phone] - this number is ONLY for scheduling at the [facility] and should not be used to get in touch with the allergy team    All visits for food challenges, medication/drug allergy testing, and drug challenges MUST be scheduled through the allergy clinic nurse. Please call the nurse at [phone] or send a [employer] message for scheduling. Appointments for these visits that are made through the schedulers or via [employer] may be cancelled or rescheduled.    Clinic Schedule:   [facility] - Monday, Tuesday, and Thursday  [address] [place] [address]    [facility] - Wednesday  [address] [place] [address]      Stop the Afrin    Start the azelastine nasal spray - 2 sprays in each nostril daily    Schedule an appointment with ENT - [phone]    ENVIRONMENTAL PERCUTANEOUS SKIN TESTING: ADULT  [facility] 11/2/2021   Consent Y   Ordering Physician Dr. Chow   Interpreting Physician Dr. Chow   Testing Technician Manuela BAUMANN RN   Location Back   Time start:  9:07 AM   Time End:  9:22 AM   Positive Control: Histatrol*ALK 1 mg/ml 7/31   Negative Control: 50% Glycerin 0   Cat Hair*ALK (10,000 BAU/ml) 0   AP Dog Hair/Dander (1:100 w/v) 0   Dust Mite p. 30,000 AU/ml 0   Dust Mite f. (30,000 AU/ml) 0   Tenzin (W/F in millimeters) 0   David [place] (100,000 BAU/mL) 0   Red Cedar (W/F in millimeters) 0   Maple/[place] (W/F in millimeters) 0   Hackberry (W/F in millimeters) 0   Sunflower (W/F in millimeters) 0   [place] *ALK (W/F in millimeters) 0    American Elm (W/F in millimeters) 0   [place] (W/F in millimeters) 0   Black [place] (W/F in millimeters) 0   Birch Mix (W/F in millimeters) 0   [place] (W/F in millimeters) 0   Oak (W/F in millimeters) 0   [place]le[place] (W/F in millimeters) 0   [place] (W/F in millimeters) 0   White Par[place] (W/F in millimeters) 0   Careless (W/F in millimeters) 0   Nettle (W/F in millimeters) 0   English Plantain (W/F in millimeters) 0   Kochia (W/F in millimeters) 0   Lamb's Quarter (W/F in millimeters) 0   Marshelder (W/F in millimeters) 0   Ragweed Mix* ALK (W/F in millimeters) 0   Russian Thistle (W/F in millimeters) 0   Sagebrush/Mugwort (W/F in millimeters) 0   Sheep Sorrel (W/F in millimeters) 0   Feather Mix* ALK (W/F in millimeters) 0   Penicillium Mix (1:10 w/v) 0   Curvularia spicifera (1:10 w/v) 0   Epicoccum (1:10 w/v) 0   Aspergillus fumigatus (1:10 w/v): 0   Alternaria tenius (1:10 w/v) 0   H. Cladosporium (1:10 w/v) 0   Phoma herbarum (1:10 w/v) 0

## 2021-11-02 NOTE — LETTER
11/2/2021         RE: Dayday Buck  [address]  [facility] [place] [address]        Dear Colleague,    Thank you for referring your patient, Dayday Buck, to the [facility]. Please see a copy of my visit note below.    Dayday Buck was seen in the Allergy Clinic at [facility].    Dayday Buck is a 31 year old White male being seen today in consultation for allergies.    He reports that in March of 2020 he started having daily nasal congestion that made it difficult to breathe. He was sometimes able to blow his nose and produce clear mucous. Occasionally pale green or yellow in color. No fevers. More recently he started noticing drainage to the back of his throat that worsens at night and keeps him awake. He does not have any sneezing or coughing. No eye watering, itching, or redness. He does not notice any pattern of worsening congestion with seasonal changes, being indoors or outdoors, being in different parts of his house, being around his three dogs, or traveling to other states. He can smell and taste normally. A few months before his congestion started he moved to an older house and worked on renovation including building a fence where he inhaled sawdust; he also got a new dog around the same time.     Since his symptoms started, he has tried daily claritin and daily dlonase for 1-3 months at a time. He has not noticed any improvement with these medications. He last used claritin and flonase about 3 months ago. He was treated with an antibiotic for a suspected sinus infection and did not improve, so a CT of the sinuses was done. He was then treated with two more courses of antibiotics with no improvement. No fevers.     About two months ago he started using afrin as needed, every few days for up to one week at a time. Last used it yesterday. He feels like this is the only medication that has improved his congestion.     He is on humira for ankylosing spondylitis.  No history of asthma. Dad with seasonal allergies and mom with history of frequent sinus infections.       Sinus CT 3/23/21:  FINDINGS:   Frontal sinuses: Aerated secretions within the inferior left frontal  sinus. Frontal recesses are patent.      Ethmoid sinuses: Mild mucosal thickening.      Right maxillary sinus: Mild mucosal thickening with aerated  secretions. The ostiomeatal unit is normal with a patent infundibulum.        Left maxillary sinus: Mild mucosal thickening with aerated secretions.  The ostiomeatal unit is normal with a patent infundibulum.      Sphenoid sinus: No significant mucosal thickening. Near midline.      Nasal septum: Normal and in the midline.      Turbinates and nasal cavity: No nasal mucosal thickening. The  turbinates are unremarkable.      Laminae papyracea and cribriform plate: Unremarkable.     Other findings: Low mAs images of the brain are unremarkable. No  suspicious lucencies around the visualized teeth.                                                               IMPRESSION:   1. Mild mucosal thickening with scattered aerated secretions.  2. Patent drainage pathways.    Past Medical History:   Diagnosis Date     Acne      AS (ankylosing spondylitis) (H)      Dyspepsia      Family History   Problem Relation Age of Onset     Family History Negative Mother      Cancer Father         skin cancer basal cell     Family History Negative Brother      Family History Negative Sister      Family History Negative Sister      Breast Cancer Maternal Aunt      Past Surgical History:   Procedure Laterality Date     HEAD & NECK SURGERY      wisdom tooth extraction     ORTHOPEDIC SURGERY      3 arthroscopic knee surgery for ACL repair       ENVIRONMENTAL HISTORY:   [place]day [place] The home is heated with a forced air. They do have central air conditioning. The patient's bedroom is furnished with Indoor plants, hard edison in bedroom and fabric window  coverings.  Pets inside the house include 2 dog(s). There is no history of cockroach or mice infestation. Do you smoke cigarettes or other recreational drugs? No Do you vape or use an e-cigarette? No. There is/are 0 smokers living in the house. There is/are 0 who smoke ecigarettes/vape living in the house. The house does not have a damp basement.     SOCIAL HISTORY:   Dayday is employed as . He lives with his spouse and child.  His spouse works in marketing.    REVIEW OF SYSTEMS:  General: negative for weight gain. negative for weight loss. negative for changes in sleep.   Eyes: negative for itching. negative for redness. negative for tearing/watering. negative for vision changes. Positive for dry eyelids  Ears: negative for fullness. negative for hearing loss. negative for dizziness.   Nose: negative for snoring.negative for changes in smell. positive  for drainage. Positive for congestion.  Throat: negative for hoarseness. negative for sore throat. negative for trouble swallowing.   Lungs: negative for cough. negative for shortness of breath.negative for wheezing. negative for sputum production.   Cardiovascular: negative for chest pain. negative for swelling of ankles. negative for fast or irregular heartbeat.   Gastrointestinal: negative for nausea. negative for heartburn. negative for acid reflux.   Musculoskeletal: negative for joint pain. negative for joint stiffness. negative for joint swelling.   Neurologic: negative for seizures. negative for fainting. negative for weakness.   Psychiatric: negative for changes in mood. negative for anxiety.   Endocrine: negative for cold intolerance. negative for heat intolerance. negative for tremors.   Hematologic: negative for easy bruising. negative for easy bleeding.  Integumentary: negative for rash. negative for scaling. negative for nail changes.       Current Outpatient Medications:      adalimumab (HUMIRA *CF*) 40 MG/0.4ML prefilled syringe kit,  Inject 0.4 mLs (40 mg) Subcutaneous every 14 days . Hold for signs of infection, then seek medical attention., Disp: 2.4 mL, Rfl: 2     fluticasone (FLONASE) 50 MCG/ACT nasal spray, Spray 2 sprays into both nostrils 2 times daily, Disp: 16 g, Rfl: 2     naproxen sodium (ANAPROX) 220 MG tablet, Take 1 tablet (220 mg) by mouth 2 times daily as needed for moderate pain, Disp: , Rfl:   Immunization History   Administered Date(s) Administered     COVID-19,PF,Pfizer (12+ Yrs) 04/26/2021, 05/17/2021     Influenza Vaccine IM > 6 months Valent IIV4 (Alfuria,Fluzone) 10/10/2017, 01/29/2020, 11/24/2020     Pneumo Conj 13-V (2010&after) 01/21/2016     Pneumococcal 23 valent 10/10/2017     Tdap (Adacel,Boostrix) 04/27/2016     Allergies   Allergen Reactions     No Known Allergies          EXAM:   There were no vitals taken for this visit.  GENERAL APPEARANCE: alert, interactive, well-appearing and not in distress   SKIN: no rashes or lesions on examined skin  HEAD: normocephalic, atraumatic  EYES: conjunctivae and sclerae clear  ENT: no scars or lesions, midline dorsum, nasal exam showed no discharge, swelling or lesions noted  NECK: no asymmetry, masses, or scars  LUNGS: normal work of breathing on room air, clear to auscultation, no cough or wheezing  HEART: extremities warm and well-perfused; regular rate and rhythm, no murmurs/gallops/rubs  MUSCULOSKELETAL: no musculoskeletal defects are noted  NEURO: no focal deficits noted  PSYCH: age appropriate mood/affect    WORKUP: Skin testing    ENVIRONMENTAL PERCUTANEOUS SKIN TESTING: ADULT  [place] [facility] 11/2/2021   Consent Y   Ordering Physician Dr. Chow   Interpreting Physician Dr. Chow   Testing Technician Manuela BAUMANN RN   Location Back   Time start:  9:07 AM   Time End:  9:22 AM   Positive Control: Histatrol*ALK 1 mg/ml 7/31   Negative Control: 50% Glycerin 0   Cat Hair*ALK (10,000 BAU/ml) 0   AP Dog Hair/Dander (1:100 w/v) 0   Dust Mite p. 30,000 AU/ml 0   Dust Mite  f. (30,000 AU/ml) 0   Tenzin (W/F in millimeters) 0   David [place] (100,000 BAU/mL) 0   Red Cedar (W/F in millimeters) 0   Maple/Oceana (W/F in millimeters) 0   Hackberry (W/F in millimeters) 0   Gallion (W/F in millimeters) 0   [place] *ALK (W/F in millimeters) 0   American Elm (W/F in millimeters) 0   [place]as (W/F in millimeters) 0   Black [place] (W/F in millimeters) 0   Birch Mix (W/F in millimeters) 0   [place] (W/F in millimeters) 0   Oak (W/F in millimeters) 0   Cocklebur (W/F in millimeters) 0   Bouse (W/F in millimeters) 0   White [place] (W/F in millimeters) 0   Careless (W/F in millimeters) 0   Nettle (W/F in millimeters) 0   English Plantain (W/F in millimeters) 0   Kochia (W/F in millimeters) 0   Lamb's Quarter (W/F in millimeters) 0   Marshelder (W/F in millimeters) 0   Ragweed Mix* ALK (W/F in millimeters) 0   Russian Thistle (W/F in millimeters) 0   Sagebrush/Mugwort (W/F in millimeters) 0   Sheep Sorrel (W/F in millimeters) 0   Feather Mix* ALK (W/F in millimeters) 0   Penicillium Mix (1:10 w/v) 0   Curvularia spicifera (1:10 w/v) 0   Epicoccum (1:10 w/v) 0   Aspergillus fumigatus (1:10 w/v): 0   Alternaria tenius (1:10 w/v) 0   H. Cladosporium (1:10 w/v) 0   Phoma herbarum (1:10 w/v) 0      Appropriate response to controls, all others negative    ASSESSMENT/PLAN:  Dayday Buck is a 31 year old male with chronic nasal congestion, found on CT to have mucosal thickening, that has not responded to antihistamines, nasal steroid sprays, or multiple courses of antibiotics. He does not have other nasal or ocular allergic symptoms, and does not report any clear triggers, seasonal patterns or exacerbating factors. Allergy skin testing was performed today for environmental and pet allergens and was negative. We discussed that afrin can cause rebound congestion and worsen his symptoms, and he is agreeable to stopping afrin, and doing a trial of a nasal antihistamine spray (azelastine), which should  address congestion more than oral antihistamine use. His congestion may have a structural etiology, and we discussed following up with ENT in a few weeks to discuss possible interventions if trial of azelastine fails to improve his symptoms.     Nonallergic rhinitis  Nasal congestion    - start azelastine nasal spray 2 sprays in each nostril twice a day for at least two weeks  - ENT referral placed  - recommend follow up with ENT if symptoms do not improve with nasal spray  - stop afrin nasal spray    Follow-up as needed.      The patient was seen and discussed with Dr. Chow.  Felipe Allen MD  Pediatric Resident PGY-1      Physician Attestation   I, Ck Chow MD, saw this patient and agree with the findings and plan of care as documented in the note.      1. Nonallergic rhinitis - Skin testing was negative for evidence of aeroallergen sensitization. Dayday was counseled that frequent use of nasal decongestant medications may lead to worsening of congestion over time. These medications should be used for brief periods of time to manage acute symptoms.    - discontinue Afrin nasal spray  - azelastine (ASTELIN) 0.1 % nasal spray; Spray 2 sprays into both nostrils 2 times daily  Dispense: 30 mL; Refill: 1  - Otolaryngology Referral; Future  - ALLERGY SKIN TESTS,ALLERGENS    2. Nasal congestion    - azelastine (ASTELIN) 0.1 % nasal spray; Spray 2 sprays into both nostrils 2 times daily  Dispense: 30 mL; Refill: 1  - Otolaryngology Referral; Future  - ALLERGY SKIN TESTS,ALLERGENS      Ck Chow MD, FAAAAI  Allergy/Immunology  [facility]      Chart documentation done in part with Dragon Voice Recognition Software. Although reviewed after completion, some word and grammatical errors may remain.      Per provider verbal order, placed Adult Environmental Panel scratch test.  Consent was obtained prior to procedure.  Once panels were placed,  patient was monitored for 15 minutes in clinic.  Provider read test after 15 minutes..  Pt tolerated procedure well.  All questions and concerns were addressed at office visit.     JACKIE Henry, RN        Again, thank you for allowing me to participate in the care of your patient.        Sincerely,        Ck Chow MD

## 2022-01-24 ENCOUNTER — IMMUNIZATION (OUTPATIENT)
Dept: NURSING | Facility: CLINIC | Age: 32
End: 2022-01-24
Payer: COMMERCIAL

## 2022-01-24 ENCOUNTER — OFFICE VISIT (OUTPATIENT)
Dept: RHEUMATOLOGY | Facility: CLINIC | Age: 32
End: 2022-01-24
Payer: COMMERCIAL

## 2022-01-24 VITALS
BODY MASS INDEX: 30.65 KG/M2 | HEIGHT: 68 IN | OXYGEN SATURATION: 98 % | WEIGHT: 202.2 LBS | DIASTOLIC BLOOD PRESSURE: 79 MMHG | SYSTOLIC BLOOD PRESSURE: 127 MMHG | HEART RATE: 70 BPM

## 2022-01-24 DIAGNOSIS — M45.7 ANKYLOSING SPONDYLITIS OF LUMBOSACRAL REGION (H): Primary | ICD-10-CM

## 2022-01-24 DIAGNOSIS — Z79.899 HIGH RISK MEDICATION USE: ICD-10-CM

## 2022-01-24 DIAGNOSIS — Z23 NEED FOR PROPHYLACTIC VACCINATION AND INOCULATION AGAINST INFLUENZA: ICD-10-CM

## 2022-01-24 DIAGNOSIS — Z23 HIGH PRIORITY FOR 2019-NCOV VACCINE: Primary | ICD-10-CM

## 2022-01-24 LAB
ALBUMIN SERPL-MCNC: 4.3 G/DL (ref 3.4–5)
ALP SERPL-CCNC: 67 U/L (ref 40–150)
ALT SERPL W P-5'-P-CCNC: 38 U/L (ref 0–70)
AST SERPL W P-5'-P-CCNC: 25 U/L (ref 0–45)
BASOPHILS # BLD AUTO: 0 10E3/UL (ref 0–0.2)
BASOPHILS NFR BLD AUTO: 0 %
BILIRUB DIRECT SERPL-MCNC: 0.1 MG/DL (ref 0–0.2)
BILIRUB SERPL-MCNC: 0.5 MG/DL (ref 0.2–1.3)
CREAT SERPL-MCNC: 0.93 MG/DL (ref 0.66–1.25)
CRP SERPL-MCNC: <2.9 MG/L (ref 0–8)
EOSINOPHIL # BLD AUTO: 0.4 10E3/UL (ref 0–0.7)
EOSINOPHIL NFR BLD AUTO: 6 %
ERYTHROCYTE [DISTWIDTH] IN BLOOD BY AUTOMATED COUNT: 11.5 % (ref 10–15)
ERYTHROCYTE [SEDIMENTATION RATE] IN BLOOD BY WESTERGREN METHOD: 4 MM/HR (ref 0–15)
FASTING STATUS PATIENT QL REPORTED: ABNORMAL
GFR SERPL CREATININE-BSD FRML MDRD: >90 ML/MIN/1.73M2
GLUCOSE BLD-MCNC: 100 MG/DL (ref 70–99)
HCT VFR BLD AUTO: 43.6 % (ref 40–53)
HGB BLD-MCNC: 15.3 G/DL (ref 13.3–17.7)
LYMPHOCYTES # BLD AUTO: 2.2 10E3/UL (ref 0.8–5.3)
LYMPHOCYTES NFR BLD AUTO: 32 %
MCH RBC QN AUTO: 30.4 PG (ref 26.5–33)
MCHC RBC AUTO-ENTMCNC: 35.1 G/DL (ref 31.5–36.5)
MCV RBC AUTO: 87 FL (ref 78–100)
MONOCYTES # BLD AUTO: 1 10E3/UL (ref 0–1.3)
MONOCYTES NFR BLD AUTO: 14 %
NEUTROPHILS # BLD AUTO: 3.4 10E3/UL (ref 1.6–8.3)
NEUTROPHILS NFR BLD AUTO: 48 %
PLATELET # BLD AUTO: 258 10E3/UL (ref 150–450)
PROT SERPL-MCNC: 7.6 G/DL (ref 6.8–8.8)
RBC # BLD AUTO: 5.04 10E6/UL (ref 4.4–5.9)
WBC # BLD AUTO: 6.9 10E3/UL (ref 4–11)

## 2022-01-24 PROCEDURE — 99214 OFFICE O/P EST MOD 30 MIN: CPT | Mod: 25 | Performed by: INTERNAL MEDICINE

## 2022-01-24 PROCEDURE — 99207 PR NO CHARGE NURSE ONLY: CPT

## 2022-01-24 PROCEDURE — 91305 COVID-19,PF,PFIZER (12+ YRS): CPT

## 2022-01-24 PROCEDURE — 82947 ASSAY GLUCOSE BLOOD QUANT: CPT | Performed by: INTERNAL MEDICINE

## 2022-01-24 PROCEDURE — 0054A COVID-19,PF,PFIZER (12+ YRS): CPT

## 2022-01-24 PROCEDURE — 90686 IIV4 VACC NO PRSV 0.5 ML IM: CPT | Performed by: INTERNAL MEDICINE

## 2022-01-24 PROCEDURE — 80076 HEPATIC FUNCTION PANEL: CPT | Performed by: INTERNAL MEDICINE

## 2022-01-24 PROCEDURE — 36415 COLL VENOUS BLD VENIPUNCTURE: CPT | Performed by: INTERNAL MEDICINE

## 2022-01-24 PROCEDURE — 85025 COMPLETE CBC W/AUTO DIFF WBC: CPT | Performed by: INTERNAL MEDICINE

## 2022-01-24 PROCEDURE — 82565 ASSAY OF CREATININE: CPT | Performed by: INTERNAL MEDICINE

## 2022-01-24 PROCEDURE — 86140 C-REACTIVE PROTEIN: CPT | Performed by: INTERNAL MEDICINE

## 2022-01-24 PROCEDURE — 85652 RBC SED RATE AUTOMATED: CPT | Performed by: INTERNAL MEDICINE

## 2022-01-24 PROCEDURE — 90471 IMMUNIZATION ADMIN: CPT | Performed by: INTERNAL MEDICINE

## 2022-01-24 ASSESSMENT — MIFFLIN-ST. JEOR: SCORE: 1841.67

## 2022-01-24 NOTE — PROGRESS NOTES
Rheumatology Clinic Visit      Dayday Buck MRN# [id number]   YOB: 1990 Age: 31 year old      Date of visit: 1/24/22   PCP: Lamin Zuniga    Chief Complaint   Patient presents with:  Ankylosing spondylitis of lumbosacral region  Flu Shot    Assessment and Plan     1. Ankylosing Spondylitis: Diagnosed in 2014 by Dr. Tammi Murrieta, rheumatologist in [place], [place].  8/22/2014 [facility] MRI of the right hip showed sacroiliitis.  He has been on Humira since 2014 with resolution of his initial symptoms that included pain and stiffness of his bilateral hips, lower back, and neck.  Compliance with Humira has improved to 80%, per patient; sometimes he forgets to take it until week #3 or 4; compliance has improved with having his wife help with reordering medication.  Many social issues going on and increased stresses identified by the patient; also with persistent low back pain and stiffness that is worse in the morning and improves with time and activity.  He would like to try more frequent Humira dosing and this is reasonable.  He understands that Humira at a frequency of every 7 days may be denied by his insurance and if so then he would rather remain on Humira every 14 days and change to a different biologic DMARD.  He would like labs done today, including glucose.  Chronic illness, progressive.      - Continue Humira 40mg SQ every 14 days   - Naproxen 220mg BID PRN; purchases OTC  - Labs now: CBC, Creatinine, Hepatic Panel, ESR, CRP, glucose    2.  Vaccinations: Vaccinations reviewed with Mr. Buck.  Risks and benefits of vaccinations were discussed.    - Influenza: To receive today  - [id number]: up to date  - [id number]: up to date  - Shingrix: Advised receiving; patient is going to check on insurance coverage before determining if it is going to be received  - COVID-19: has received the Pfizer COVID-19 vaccine on 4/26/2021 and 5/17/2021; 3rd dose due now and appointment has been made  to receive later today, and 4th mRNA vaccine dose due 5 months after the 3rd dose by todays recommendations    Total minutes spent in evaluation with patient, documentation, , and review of pertinent studies and chart notes: 22       Mr. Buck verbalized agreement with and understanding of the rational for the diagnosis and treatment plan.  All questions were answered to best of my ability and the patient's satisfaction. Mr. Buck was advised to contact the clinic with any questions that may arise after the clinic visit.      Thank you for involving me in the care of the patient    Return to clinic: 6 months      HPI   Dayday Buck is a 31 year old male with a past medical history significant for ankylosing spondylitis who is seen for f/u of ankylosing spondylitis.    10/7/2016 rheumatology clinic note by Dr. Tammi Murrieta in [place], [place], documents HLA-B27 positive, sacroiliitis on MRI.  On Humira, naproxen PRN. Ankylosing Spondylitis.      2014 right hip MRI showed sacroiliitis    4/9/2019: Mr. Buck reported that he was doing great on Humira but he lost his job and insurance in December 2018 so has been off Humira since then.  He did well for about 2 months but then on month 2-4 he has noticed an increased use of naproxen.  He now uses naproxen 220-440 mg twice daily that is helpful for his lower back pain and stiffness.  Morning stiffness for about 1 hour that improves with activity and time and NSAIDs.  He would like to restart Humira.      10/8/2019: no showed to scheduled clinic visit    1/29/2020: doing well when taking humira every 2 weeks, but often late by 1-2 weeks and symptoms return when late.  Mostly back issues when more symptomatic, and morning stiffness for about 1 hour. Says that he will be better about taking Humira with the help from phone reminders and his wife.     7/29/2020: doing better with taking Humira consistently; every 14 days.  Using naproxen 220mg BID PRN, about 2 days per  week. More symptomatic for about 1 day prior to his dose of Humira, but very mildly increased achiness he says. Generally the achiness is just related to exercise.  He also notes having seasonal allergies, nasal congestion, and a postnasal drip but no cough.  He wonders what he should do for his allergies.    1/26/2021: dental work (crown) in Nov 2020 with pain since then and using naproxen for the tooth pain; hasn't followed up with his dentist.  AS is doing well without pain or stiffness.  Tolerating naproxen and Humira well. No GERD symptoms. No blood in urine or stool.     7/26/2021: Doing well at this time.  No joint pain or morning stiffness.  70% of the time he is taking Humira every 2 weeks, but occasionally he may take it on week #3 or week #4.  When he delays Humira sometimes he feels more achy and stiff.  As long as he takes Humira every 2 weeks and he has no joint pain or stiffness.  No joint swelling.  Has had some sinus congestion and is taking Claritin and Flonase at the direction of his PCP.    Today, 1/24/2022: baby born in August 2021 and when leaning back while holding the baby, bouncing and rocking the baby he feels like that aggravates his back. Also notices that arching his back in a short shower aggrevates his back.  Gained weight.  Has compressed his job into 7AM-1PM so that he can take care of his child in the morning.  His wife is stepping away from her job and that is a financial burden.  Lifting weights was routine prior to marriage, restarted weight lifting, but finding it hard to stay motivated to exercise. Then minor cold last week.  Humira compliance has been better but not perfect. Using naproxen 220mg 0-1x/day.  Feels like overall his back pain and stiffness is worse in the morning and improves with time and activity.  Would like to try taking Humira every 7 days to see if he would have more benefit, but he does not want to try different medication if this frequency Humira is denied  by his insurance and would rather than change back to Humira every 14 days.  His arthritis is not limiting any of his daily activities.    Denies fevers, chills, nausea, vomiting, constipation, diarrhea. No abdominal pain. No chest pain/pressure, palpitations, or shortness of breath. No LE swelling. No neck pain. No oral or nasal sores.  No sicca symptoms. No photosensitivity or photophobia.     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    ROS   12 point review of system was completed and negative except as noted in the HPI     Active Problem List     Patient Active Problem List   Diagnosis     AS (ankylosing spondylitis) (H)     Acne vulgaris     Vegan diet     Past Medical History     Past Medical History:   Diagnosis Date     Acne      AS (ankylosing spondylitis) (H)      Dyspepsia      Past Surgical History     Past Surgical History:   Procedure Laterality Date     HEAD & NECK SURGERY      wisdom tooth extraction     ORTHOPEDIC SURGERY      3 arthroscopic knee surgery for ACL repair     Allergy     No Known Allergies  Current Medication List     Current Outpatient Medications   Medication Sig     adalimumab (HUMIRA *CF*) 40 MG/0.4ML prefilled syringe kit Inject 0.4 mLs (40 mg) Subcutaneous every 7 days . Hold for signs of infection, then seek medical attention.     azelastine (ASTELIN) 0.1 % nasal spray Spray 2 sprays into both nostrils 2 times daily     naproxen sodium (ANAPROX) 220 MG tablet Take 1 tablet (220 mg) by mouth 2 times daily as needed for moderate pain     oxymetazoline (AFRIN) 0.05 % nasal spray Spray 2 sprays into both nostrils 2 times daily     fluticasone (FLONASE) 50 MCG/ACT nasal spray Spray 2 sprays into both nostrils 2 times daily (Patient not taking: Reported on 11/2/2021)     No current facility-administered medications for this visit.     Social History   See HPI    Family History  "    Family History   Problem Relation Age of Onset     Family History Negative Mother      Cancer Father         skin cancer basal cell     Family History Negative Brother      Family History Negative Sister      Family History Negative Sister      Breast Cancer Maternal Aunt      No FHx of rheumatologic disease    Physical Exam     Temp Readings from Last 3 Encounters:   11/24/20 97.3  F (36.3  C) (Tympanic)   04/09/19 98.7  F (37.1  C) (Oral)   04/01/19 98.5  F (36.9  C) (Oral)     BP Readings from Last 5 Encounters:   01/24/22 127/79   11/02/21 (!) 142/92   07/26/21 134/84   11/24/20 120/76   01/29/20 137/75     Pulse Readings from Last 1 Encounters:   01/24/22 70     Resp Readings from Last 1 Encounters:   04/09/19 16     Estimated body mass index is 30.74 kg/m  as calculated from the following:    Height as of this encounter: 1.727 m (5' 8\").    Weight as of this encounter: 91.7 kg (202 lb 3.2 oz).    GEN: NAD. Healthy appearing adult.   HEENT:  Anicteric, noninjected sclera. No obvious external lesions of the ear and nose. Hearing intact.  PULM: No increased work of breathing.   MSK: MCPs, PIPs, DIPs without swelling or tenderness to palpation.  Wrists without swelling or tenderness to palpation.  Elbows and shoulders without swelling or tenderness to palpation.  Knees, ankles, and MTPs without swelling or tenderness to palpation.  Achilles tendons nontender to palpation.  Able to touch toes from a standing position without bending his knees.  Heel and occiput to wall without difficulty.  Negative Schober's.  SKIN: No rash or jaundice seen.  No nail pitting.  PSYCH: Alert. Appropriate.     Labs / Imaging (select studies)       CBC  Recent Labs   Lab Test 07/26/21  1132 07/24/20  0826 01/29/20  1038 04/09/19  1328   WBC 5.9 5.5 6.6 7.2   RBC 4.70 4.71 4.65 4.89   HGB 14.5 14.8 14.1 15.1   HCT 40.7 40.7 40.0 41.9   MCV 87 86 86 86   RDW 11.8 11.9 11.9 11.7    218 235 258   MCH 30.9 31.4 30.3 30.9   MCHC " 35.6 36.4 35.3 36.0   NEUTROPHIL 45 40.9 46.0 53.7   LYMPH 36 34.4 38.1 32.0   MONOCYTE 11 10.7 10.0 10.0   EOSINOPHIL 7 13.5 5.3 4.0   BASOPHIL 0 0.5 0.6 0.3   ANEU  --  2.3 3.0 3.9   ALYM  --  1.9 2.5 2.3   EPI  --  0.6 0.7 0.7   AEOS  --  0.7 0.4 0.3   ABAS  --  0.0 0.0 0.0   ANEUTAUTO 2.7  --   --   --    ALYMPAUTO 2.1  --   --   --    AMONOAUTO 0.7  --   --   --    AEOSAUTO 0.4  --   --   --    ABSBASO 0.0  --   --   --      CMP  Recent Labs   Lab Test 07/26/21  1132 11/24/20  1417 07/24/20  0826 01/29/20  1038 04/09/19  1328 10/10/17  1359 03/24/17  0953 03/08/17  1108   NA  --   --   --   --   --   --  139  --    POTASSIUM  --   --   --   --   --   --  4.1  --    CHLORIDE  --   --   --   --   --   --  104  --    CO2  --   --   --   --   --   --  29  --    ANIONGAP  --   --   --   --   --   --  6  --    GLC  --  69*  --   --   --   --  80 86   BUN  --   --   --   --   --   --  13  --    CR 0.78  --  0.93 0.84 1.04   < > 0.82  --    GFRESTIMATED >90  --  >90 >90 >90   < > >90  Non  GFR Calc    --    GFRESTBLACK  --   --  >90 >90 >90   < > >90  African American GFR Calc    --    UZMA  --   --   --   --   --   --  8.7  --    BILITOTAL 0.6  --  0.9 0.4 0.6   < > 0.5  --    ALBUMIN 4.4  --  4.4 4.5 4.6   < > 4.3  --    PROTTOTAL 7.4  --  8.1 7.8 8.1   < > 7.6  --    ALKPHOS 55  --  63 54 64   < > 71  --    AST 25  --  29 22 23   < > 24  --    ALT 31  --  35 24 31   < > 29  --     < > = values in this interval not displayed.     Calcium/VitaminD  Recent Labs   Lab Test 03/24/17  0953   UZMA 8.7     ESR/CRP  Recent Labs   Lab Test 07/26/21  1132 07/24/20  0826 01/29/20  1038   SED 5 5 5   CRP <2.9 <2.9 <2.9     Lipid Panel  Recent Labs   Lab Test 11/24/20  1417 03/08/17  1108   CHOL 206* 163   TRIG 136 108   HDL 37* 38*   * 103*   NHDL 169* 125     Hepatitis B  Recent Labs   Lab Test 07/26/21  1131   HBCAB Nonreactive   HEPBANG Nonreactive     Hepatitis C  Recent Labs   Lab Test 07/26/21  1131    HCVAB Nonreactive     Tuberculosis Screening  Recent Labs   Lab Test 07/26/21  1133 01/29/20  1038 10/10/17  1359   TBRES Negative Negative  --    TBRSLT  --   --  Negative   TBAGN  --   --  0.00     Immunization History     Immunization History   Administered Date(s) Administered     COVID-19,PF,Pfizer (12+ Yrs) 04/26/2021, 05/17/2021, 01/24/2022     Influenza Vaccine IM > 6 months Valent IIV4 (Alfuria,Fluzone) 10/10/2017, 01/29/2020, 11/24/2020, 01/24/2022     Pneumo Conj 13-V (2010&after) 01/21/2016     Pneumococcal 23 valent 10/10/2017     Tdap (Adacel,Boostrix) 04/27/2016          Chart documentation done in part with Dragon Voice recognition Software. Although reviewed after completion, some word and grammatical error may remain.    Bobby Middleton MD

## 2022-01-24 NOTE — PATIENT INSTRUCTIONS
RHEUMATOLOGY    Dr. Bobby Middleton    [address] [place] [address]  Phone number: [phone]  Fax number: [phone]      Thank you for choosing [facility]!    Kassidy Dave CMA Rheumatology

## 2022-01-25 ENCOUNTER — TELEPHONE (OUTPATIENT)
Dept: RHEUMATOLOGY | Facility: CLINIC | Age: 32
End: 2022-01-25
Payer: COMMERCIAL

## 2022-01-25 DIAGNOSIS — M45.7 ANKYLOSING SPONDYLITIS OF LUMBOSACRAL REGION (H): ICD-10-CM

## 2022-01-26 DIAGNOSIS — R09.81 NASAL CONGESTION: ICD-10-CM

## 2022-01-26 DIAGNOSIS — J31.0 NONALLERGIC RHINITIS: ICD-10-CM

## 2022-01-26 NOTE — TELEPHONE ENCOUNTER
..Reason for Call:  Medication or medication refill:    Do you use a [facility]?  Name of the pharmacy and phone number for the current request:    [facility] [phone]    Name of the medication requested:   azelastine (ASTELIN) 0.1 % nasal spray  Other request: none    Can we leave a detailed message on this number? YES    Phone number patient can be reached at: Home number on file [phone] (home)    Best Time: anytime    Call taken on 1/26/2022 at 12:32 PM by Nadia Tobar

## 2022-01-27 RX ORDER — AZELASTINE 1 MG/ML
2 SPRAY, METERED NASAL 2 TIMES DAILY
Qty: 30 ML | Refills: 1 | Status: SHIPPED | OUTPATIENT
Start: 2022-01-27

## 2022-01-27 NOTE — TELEPHONE ENCOUNTER
"RN refilled medication per [facility] Refill Protocol.     Debora WESTBROOK RN      Requested Prescriptions   Pending Prescriptions Disp Refills     azelastine (ASTELIN) 0.1 % nasal spray 30 mL 1     Sig: Spray 2 sprays into both nostrils 2 times daily       Antihistamines Protocol Passed - 1/27/2022  1:45 PM        Passed - Patient is 3-64 years of age     Apply weight-based dosing for peds patients age 3 - 12 years of age.    Forward request to provider for patients under the age of 3 or over the age of 64.          Passed - Recent (12 mo) or future (30 days) visit within the authorizing provider's specialty     Patient has had an office visit with the authorizing provider or a provider within the authorizing providers department within the previous 12 mos or has a future within next 30 days. See \"Patient Info\" tab in inbasket, or \"Choose Columns\" in Meds & Orders section of the refill encounter.              Passed - Medication is active on med list       Nasal Allergy Protocol Passed - 1/27/2022  1:45 PM        Passed - Patient is age 12 or older        Passed - Recent (12 mo) or future (30 days) visit within the authorizing provider's specialty     Patient has had an office visit with the authorizing provider or a provider within the authorizing providers department within the previous 12 mos or has a future within next 30 days. See \"Patient Info\" tab in inbasket, or \"Choose Columns\" in Meds & Orders section of the refill encounter.              Passed - Medication is active on med list             "

## 2022-01-28 NOTE — TELEPHONE ENCOUNTER
PRIOR AUTHORIZATION DENIED    Medication: humira (weekly dosing)     Denial Date: 1/28/2022    Denial Rational: off label

## 2022-01-28 NOTE — TELEPHONE ENCOUNTER
Called pt and let him know about the below. He was grateful for the call    Brooke LEWIS RN Specialty Triage 1/28/2022 1:54 PM

## 2022-01-28 NOTE — TELEPHONE ENCOUNTER
RN: Please call to notify Dayday Buck that Humira every 7 days was denied by his insurance. Therefore, Humira every 14 days will be continued as we discussed at his appointment.      Bobby Middleton MD  1/28/2022 1:16 PM

## 2022-01-29 ENCOUNTER — HEALTH MAINTENANCE LETTER (OUTPATIENT)
Age: 32
End: 2022-01-29

## 2022-07-25 ENCOUNTER — ANCILLARY PROCEDURE (OUTPATIENT)
Dept: GENERAL RADIOLOGY | Facility: CLINIC | Age: 32
End: 2022-07-25
Attending: INTERNAL MEDICINE
Payer: COMMERCIAL

## 2022-07-25 ENCOUNTER — OFFICE VISIT (OUTPATIENT)
Dept: RHEUMATOLOGY | Facility: CLINIC | Age: 32
End: 2022-07-25

## 2022-07-25 VITALS
SYSTOLIC BLOOD PRESSURE: 131 MMHG | HEART RATE: 54 BPM | WEIGHT: 188 LBS | DIASTOLIC BLOOD PRESSURE: 80 MMHG | BODY MASS INDEX: 28.59 KG/M2 | OXYGEN SATURATION: 96 %

## 2022-07-25 DIAGNOSIS — M45.7 ANKYLOSING SPONDYLITIS OF LUMBOSACRAL REGION (H): Primary | ICD-10-CM

## 2022-07-25 DIAGNOSIS — G89.29 CHRONIC BILATERAL LOW BACK PAIN WITHOUT SCIATICA: ICD-10-CM

## 2022-07-25 DIAGNOSIS — M45.7 ANKYLOSING SPONDYLITIS OF LUMBOSACRAL REGION (H): ICD-10-CM

## 2022-07-25 DIAGNOSIS — M54.50 CHRONIC BILATERAL LOW BACK PAIN WITHOUT SCIATICA: ICD-10-CM

## 2022-07-25 PROCEDURE — 72200 X-RAY EXAM SI JOINTS: CPT | Mod: TC | Performed by: RADIOLOGY

## 2022-07-25 PROCEDURE — 99214 OFFICE O/P EST MOD 30 MIN: CPT | Performed by: INTERNAL MEDICINE

## 2022-07-25 NOTE — PATIENT INSTRUCTIONS
RHEUMATOLOGY    Dr. Bobby Middleton    [facility]  [address] [place] [address]  [place] [address]  Phone number: [phone]  Fax number: [phone]      Thank you for choosing [facility]!    Kassidy Dave CMA Rheumatology      For your MRI at [facility] please call [phone] to schedule your appointment.

## 2022-07-25 NOTE — NURSING NOTE
RAPID3 (0-30) Cumulative Score  4.7          RAPID3 Weighted Score (divide #4 by 3 and that is the weighted score)  1.5

## 2022-08-09 ENCOUNTER — ANCILLARY PROCEDURE (OUTPATIENT)
Dept: MRI IMAGING | Facility: CLINIC | Age: 32
End: 2022-08-09
Attending: INTERNAL MEDICINE
Payer: COMMERCIAL

## 2022-08-09 DIAGNOSIS — M45.7 ANKYLOSING SPONDYLITIS OF LUMBOSACRAL REGION (H): ICD-10-CM

## 2022-08-09 DIAGNOSIS — M54.50 CHRONIC BILATERAL LOW BACK PAIN WITHOUT SCIATICA: ICD-10-CM

## 2022-08-09 DIAGNOSIS — G89.29 CHRONIC BILATERAL LOW BACK PAIN WITHOUT SCIATICA: ICD-10-CM

## 2022-08-09 PROCEDURE — 72195 MRI PELVIS W/O DYE: CPT

## 2022-09-11 ENCOUNTER — HEALTH MAINTENANCE LETTER (OUTPATIENT)
Age: 32
End: 2022-09-11

## 2023-01-20 ENCOUNTER — LAB (OUTPATIENT)
Dept: LAB | Facility: CLINIC | Age: 33
End: 2023-01-20
Payer: COMMERCIAL

## 2023-01-20 DIAGNOSIS — M45.7 ANKYLOSING SPONDYLITIS OF LUMBOSACRAL REGION (H): ICD-10-CM

## 2023-01-20 LAB
ALBUMIN SERPL BCG-MCNC: 4.7 G/DL (ref 3.5–5.2)
ALP SERPL-CCNC: 66 U/L (ref 40–129)
ALT SERPL W P-5'-P-CCNC: 24 U/L (ref 10–50)
AST SERPL W P-5'-P-CCNC: 28 U/L (ref 10–50)
BASOPHILS # BLD AUTO: 0.1 10E3/UL (ref 0–0.2)
BASOPHILS NFR BLD AUTO: 1 %
BILIRUB DIRECT SERPL-MCNC: <0.2 MG/DL (ref 0–0.3)
BILIRUB SERPL-MCNC: 0.6 MG/DL
CREAT SERPL-MCNC: 0.93 MG/DL (ref 0.67–1.17)
CRP SERPL-MCNC: <3 MG/L
EOSINOPHIL # BLD AUTO: 0.5 10E3/UL (ref 0–0.7)
EOSINOPHIL NFR BLD AUTO: 7 %
ERYTHROCYTE [DISTWIDTH] IN BLOOD BY AUTOMATED COUNT: 11.5 % (ref 10–15)
ERYTHROCYTE [SEDIMENTATION RATE] IN BLOOD BY WESTERGREN METHOD: 7 MM/HR (ref 0–15)
GFR SERPL CREATININE-BSD FRML MDRD: >90 ML/MIN/1.73M2
HCT VFR BLD AUTO: 43.6 % (ref 40–53)
HGB BLD-MCNC: 15 G/DL (ref 13.3–17.7)
IMM GRANULOCYTES # BLD: 0 10E3/UL
IMM GRANULOCYTES NFR BLD: 0 %
LYMPHOCYTES # BLD AUTO: 2.1 10E3/UL (ref 0.8–5.3)
LYMPHOCYTES NFR BLD AUTO: 34 %
MCH RBC QN AUTO: 29.7 PG (ref 26.5–33)
MCHC RBC AUTO-ENTMCNC: 34.4 G/DL (ref 31.5–36.5)
MCV RBC AUTO: 86 FL (ref 78–100)
MONOCYTES # BLD AUTO: 0.6 10E3/UL (ref 0–1.3)
MONOCYTES NFR BLD AUTO: 9 %
NEUTROPHILS # BLD AUTO: 3.1 10E3/UL (ref 1.6–8.3)
NEUTROPHILS NFR BLD AUTO: 49 %
PLATELET # BLD AUTO: 256 10E3/UL (ref 150–450)
PROT SERPL-MCNC: 7.7 G/DL (ref 6.4–8.3)
RBC # BLD AUTO: 5.05 10E6/UL (ref 4.4–5.9)
WBC # BLD AUTO: 6.3 10E3/UL (ref 4–11)

## 2023-01-20 PROCEDURE — 86140 C-REACTIVE PROTEIN: CPT

## 2023-01-20 PROCEDURE — 86481 TB AG RESPONSE T-CELL SUSP: CPT

## 2023-01-20 PROCEDURE — 36415 COLL VENOUS BLD VENIPUNCTURE: CPT

## 2023-01-20 PROCEDURE — 85025 COMPLETE CBC W/AUTO DIFF WBC: CPT

## 2023-01-20 PROCEDURE — 80076 HEPATIC FUNCTION PANEL: CPT

## 2023-01-20 PROCEDURE — 82565 ASSAY OF CREATININE: CPT

## 2023-01-20 PROCEDURE — 85652 RBC SED RATE AUTOMATED: CPT

## 2023-01-22 LAB
GAMMA INTERFERON BACKGROUND BLD IA-ACNC: 0.02 IU/ML
M TB IFN-G BLD-IMP: NEGATIVE
M TB IFN-G CD4+ BCKGRND COR BLD-ACNC: 9.98 IU/ML
MITOGEN IGNF BCKGRD COR BLD-ACNC: 0.01 IU/ML
MITOGEN IGNF BCKGRD COR BLD-ACNC: 0.01 IU/ML
QUANTIFERON MITOGEN: 10 IU/ML
QUANTIFERON NIL TUBE: 0.02 IU/ML
QUANTIFERON TB1 TUBE: 0.03 IU/ML
QUANTIFERON TB2 TUBE: 0.03

## 2023-01-24 ENCOUNTER — OFFICE VISIT (OUTPATIENT)
Dept: RHEUMATOLOGY | Facility: CLINIC | Age: 33
End: 2023-01-24
Payer: COMMERCIAL

## 2023-01-24 VITALS
OXYGEN SATURATION: 98 % | WEIGHT: 182 LBS | HEART RATE: 67 BPM | BODY MASS INDEX: 27.67 KG/M2 | DIASTOLIC BLOOD PRESSURE: 76 MMHG | SYSTOLIC BLOOD PRESSURE: 120 MMHG

## 2023-01-24 DIAGNOSIS — M51.369 DEGENERATIVE DISC DISEASE, LUMBAR: ICD-10-CM

## 2023-01-24 DIAGNOSIS — M45.7 ANKYLOSING SPONDYLITIS OF LUMBOSACRAL REGION (H): Primary | ICD-10-CM

## 2023-01-24 DIAGNOSIS — Z23 NEED FOR PROPHYLACTIC VACCINATION AND INOCULATION AGAINST INFLUENZA: ICD-10-CM

## 2023-01-24 PROCEDURE — 90471 IMMUNIZATION ADMIN: CPT | Performed by: INTERNAL MEDICINE

## 2023-01-24 PROCEDURE — 99214 OFFICE O/P EST MOD 30 MIN: CPT | Mod: 25 | Performed by: INTERNAL MEDICINE

## 2023-01-24 PROCEDURE — 90686 IIV4 VACC NO PRSV 0.5 ML IM: CPT | Performed by: INTERNAL MEDICINE

## 2023-01-24 NOTE — PROGRESS NOTES
Rheumatology Clinic Visit      Dayday Buck MRN# [id number]   YOB: 1990 Age: 32 year old      Date of visit: 1/24/23   PCP: Lamin Zuniga    Chief Complaint   Patient presents with:  RECHECK: arthritis    Assessment and Plan     1. Ankylosing Spondylitis: Diagnosed in 2014 by Dr. Tammi Murrieta, rheumatologist in [place], [place].  8/22/2014 [facility] MRI of the right hip showed sacroiliitis.  He has been on Humira since 2014 with resolution of his initial symptoms that included pain and stiffness of his bilateral hips, lower back, and neck.  He then had issues with Humira compliance, usually because he was not reordering his medication but since he has had his wife help with reordering he has been much better taking medication as prescribed.  8/9/2022 MRI of the pelvis showed degenerative changes at L5-S1 but no active sacroiliitis.  Currently doing well as long as he takes Humira as prescribed.   Chronic illness, stable.     - Continue Humira 40mg SQ every 14 days    - Naproxen 220mg BID PRN; purchases OTC  - No rheumatology labs prior to rheumatology follow-up in 6 months    2. Intermittent chronic nonradiating low back pain; lumbar spine DDD: Degenerative in nature.  Disc desiccation with shallow disc protrusion at L5-S1 seen on 8/9/2022 pelvic MRI.  He is doing well with home exercises; asymptomatic at this time.  Consider physical therapy in the future if needed.  Chronic illness    3.  Vaccinations: Vaccinations reviewed with Mr. Buck.  Risks and benefits of vaccinations were discussed.    - Influenza: Encouraged yearly vaccination; will receive today  - [id number]: up to date  - [id number]: up to date  - COVID-19: advised updating     Total minutes spent in evaluation with patient, documentation, , and review of pertinent studies and chart notes: 20     Mr. Buck verbalized agreement with and understanding of the rational for the diagnosis and treatment plan.  All  questions were answered to best of my ability and the patient's satisfaction. Mr. Buck was advised to contact the clinic with any questions that may arise after the clinic visit.      Thank you for involving me in the care of the patient    Return to clinic: 6 months      HPI   Dayday Buck is a 32 year old male with a past medical history significant for ankylosing spondylitis who is seen for f/u of ankylosing spondylitis.    10/7/2016 rheumatology clinic note by Dr. Tammi Murrieta in [place], [place], documents HLA-B27 positive, sacroiliitis on MRI.  On Humira, naproxen PRN. Ankylosing Spondylitis.      2014 right hip MRI showed sacroiliitis    4/9/2019: Mr. Buck reported that he was doing great on Humira but he lost his job and insurance in December 2018 so has been off Humira since then.  He did well for about 2 months but then on month 2-4 he has noticed an increased use of naproxen.  He now uses naproxen 220-440 mg twice daily that is helpful for his lower back pain and stiffness.  Morning stiffness for about 1 hour that improves with activity and time and NSAIDs.  He would like to restart Humira.      10/8/2019: no showed to scheduled clinic visit    1/29/2020: doing well when taking humira every 2 weeks, but often late by 1-2 weeks and symptoms return when late.  Mostly back issues when more symptomatic, and morning stiffness for about 1 hour. Says that he will be better about taking Humira with the help from phone reminders and his wife.     7/29/2020: doing better with taking Humira consistently; every 14 days.  Using naproxen 220mg BID PRN, about 2 days per week. More symptomatic for about 1 day prior to his dose of Humira, but very mildly increased achiness he says. Generally the achiness is just related to exercise.  He also notes having seasonal allergies, nasal congestion, and a postnasal drip but no cough.  He wonders what he should do for his allergies.    1/26/2021: dental work (crown) in Nov 2020 with  pain since then and using naproxen for the tooth pain; hasn't followed up with his dentist.  AS is doing well without pain or stiffness.  Tolerating naproxen and Humira well. No GERD symptoms. No blood in urine or stool.     7/26/2021: Doing well at this time.  No joint pain or morning stiffness.  70% of the time he is taking Humira every 2 weeks, but occasionally he may take it on week #3 or week #4.  When he delays Humira sometimes he feels more achy and stiff.  As long as he takes Humira every 2 weeks and he has no joint pain or stiffness.  No joint swelling.  Has had some sinus congestion and is taking Claritin and Flonase at the direction of his PCP.    1/24/2022: baby born in August 2021 and when leaning back while holding the baby, bouncing and rocking the baby he feels like that aggravates his back. Also notices that arching his back in a short shower aggrevates his back.  Gained weight.  Has compressed his job into 7AM-1PM so that he can take care of his child in the morning.  His wife is stepping away from her job and that is a financial burden.  Lifting weights was routine prior to marriage, restarted weight lifting, but finding it hard to stay motivated to exercise. Then minor cold last week.  Humira compliance has been better but not perfect. Using naproxen 220mg 0-1x/day.  Feels like overall his back pain and stiffness is worse in the morning and improves with time and activity.  Would like to try taking Humira every 7 days to see if he would have more benefit, but he does not want to try different medication if this frequency Humira is denied by his insurance and would rather than change back to Humira every 14 days.  His arthritis is not limiting any of his daily activities.    7/25/2022: Inflammatory Tritus is currently doing well.  Tolerating Humira that he says he takes every 14 days without missed doses now.  Morning stiffness for about 5-10 minutes.  Sometimes with low back pain that is worse  with activity and improves with rest; pain does not radiate down the legs.  He notes that a friend he has is having serial monitoring with x-rays due to ankylosing spondylitis and he would like to have repeat imaging of his SI joints where he has the intermittent pain.     Today, 1/24/2023: low back ache when putting on shoes several months ago, but not now.  Dec 2022 COVID-19 infection so held Humira x2 weeks without worsening symptoms. 90% of the time takes Humira on time; other times may be delayed due to forgetting to order Humira.  Has been exercising more regularly and started running again, with resolution of the low back pain.  No morning stiffness.  No eye pain or redness, but sometimes with a sandpaper feeling in his eyes/feels dry.    Denies fevers, chills, nausea, vomiting, constipation, diarrhea. No abdominal pain. No chest pain/pressure, palpitations, or shortness of breath. No LE swelling. No neck pain. No oral or nasal sores.  No sicca symptoms. No photosensitivity or photophobia.     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    ROS   12 point review of system was completed and negative except as noted in the HPI     Active Problem List     Patient Active Problem List   Diagnosis     AS (ankylosing spondylitis) (H)     Acne vulgaris     Vegan diet     Past Medical History     Past Medical History:   Diagnosis Date     Acne      AS (ankylosing spondylitis) (H)      Dyspepsia      Past Surgical History     Past Surgical History:   Procedure Laterality Date     HEAD & NECK SURGERY      wisdom tooth extraction     ORTHOPEDIC SURGERY      3 arthroscopic knee surgery for ACL repair     Allergy     No Known Allergies  Current Medication List     Current Outpatient Medications   Medication Sig     adalimumab (HUMIRA *CF*) 40 MG/0.4ML prefilled syringe kit Inject 0.4 mLs (40 mg) Subcutaneous every 14 days .  "Hold for signs of infection, then seek medical attention.     azelastine (ASTELIN) 0.1 % nasal spray Spray 2 sprays into both nostrils 2 times daily     fluticasone (FLONASE) 50 MCG/ACT nasal spray Spray 2 sprays into both nostrils 2 times daily (Patient not taking: Reported on 11/2/2021)     naproxen sodium (ANAPROX) 220 MG tablet Take 1 tablet (220 mg) by mouth 2 times daily as needed for moderate pain     oxymetazoline (AFRIN) 0.05 % nasal spray Spray 2 sprays into both nostrils 2 times daily     No current facility-administered medications for this visit.     Social History   See HPI    Family History     Family History   Problem Relation Age of Onset     Family History Negative Mother      Cancer Father         skin cancer basal cell     Family History Negative Brother      Family History Negative Sister      Family History Negative Sister      Breast Cancer Maternal Aunt      No FHx of rheumatologic disease    Physical Exam     Temp Readings from Last 3 Encounters:   11/24/20 97.3  F (36.3  C) (Tympanic)   04/09/19 98.7  F (37.1  C) (Oral)   04/01/19 98.5  F (36.9  C) (Oral)     BP Readings from Last 5 Encounters:   01/24/23 120/76   07/25/22 131/80   01/24/22 127/79   11/02/21 (!) 142/92   07/26/21 134/84     Pulse Readings from Last 1 Encounters:   01/24/23 67     Resp Readings from Last 1 Encounters:   04/09/19 16     Estimated body mass index is 27.67 kg/m  as calculated from the following:    Height as of 1/24/22: 1.727 m (5' 8\").    Weight as of this encounter: 82.6 kg (182 lb).    GEN: NAD. Healthy appearing adult.   HEENT:  Anicteric, noninjected sclera. No obvious external lesions of the ear and nose. Hearing intact.  CV: S1, S2. RRR. No m/r/g  PULM: No increased work of breathing. CTA bilaterally   MSK: MCPs, PIPs, DIPs without swelling or tenderness to palpation.  Wrists without swelling or tenderness to palpation.  Elbows and shoulders without swelling or tenderness to palpation.  Knees, ankles, " and MTPs without swelling or tenderness to palpation.    SKIN: No rash or jaundice seen  PSYCH: Alert. Appropriate.      Labs / Imaging (select studies)     CBC  Recent Labs   Lab Test 01/20/23  0740 01/24/22  0914 07/26/21  1132 07/24/20  0826 01/29/20  1038 04/09/19  1328   WBC 6.3 6.9 5.9 5.5 6.6 7.2   RBC 5.05 5.04 4.70 4.71 4.65 4.89   HGB 15.0 15.3 14.5 14.8 14.1 15.1   HCT 43.6 43.6 40.7 40.7 40.0 41.9   MCV 86 87 87 86 86 86   RDW 11.5 11.5 11.8 11.9 11.9 11.7    258 231 218 235 258   MCH 29.7 30.4 30.9 31.4 30.3 30.9   MCHC 34.4 35.1 35.6 36.4 35.3 36.0   NEUTROPHIL 49 48 45 40.9 46.0 53.7   LYMPH 34 32 36 34.4 38.1 32.0   MONOCYTE 9 14 11 10.7 10.0 10.0   EOSINOPHIL 7 6 7 13.5 5.3 4.0   BASOPHIL 1 0 0 0.5 0.6 0.3   ANEU  --   --   --  2.3 3.0 3.9   ALYM  --   --   --  1.9 2.5 2.3   EPI  --   --   --  0.6 0.7 0.7   AEOS  --   --   --  0.7 0.4 0.3   ABAS  --   --   --  0.0 0.0 0.0   ANEUTAUTO 3.1 3.4 2.7  --   --   --    ALYMPAUTO 2.1 2.2 2.1  --   --   --    AMONOAUTO 0.6 1.0 0.7  --   --   --    AEOSAUTO 0.5 0.4 0.4  --   --   --    ABSBASO 0.1 0.0 0.0  --   --   --      CMP  Recent Labs   Lab Test 01/20/23  0740 01/24/22  0914 07/26/21  1132 11/24/20  1417 07/24/20  0826 01/29/20  1038 04/09/19  1328 10/10/17  1359 03/24/17  0953   NA  --   --   --   --   --   --   --   --  139   POTASSIUM  --   --   --   --   --   --   --   --  4.1   CHLORIDE  --   --   --   --   --   --   --   --  104   CO2  --   --   --   --   --   --   --   --  29   ANIONGAP  --   --   --   --   --   --   --   --  6   GLC  --  100*  --  69*  --   --   --   --  80   BUN  --   --   --   --   --   --   --   --  13   CR 0.93 0.93 0.78  --  0.93 0.84 1.04   < > 0.82   GFRESTIMATED >90 >90 >90  --  >90 >90 >90   < > >90  Non  GFR Calc     GFRESTBLACK  --   --   --   --  >90 >90 >90   < > >90  African American GFR Calc     UZMA  --   --   --   --   --   --   --   --  8.7   BILITOTAL 0.6 0.5 0.6  --  0.9 0.4 0.6   < >  0.5   ALBUMIN 4.7 4.3 4.4  --  4.4 4.5 4.6   < > 4.3   PROTTOTAL 7.7 7.6 7.4  --  8.1 7.8 8.1   < > 7.6   ALKPHOS 66 67 55  --  63 54 64   < > 71   AST 28 25 25  --  29 22 23   < > 24   ALT 24 38 31  --  35 24 31   < > 29    < > = values in this interval not displayed.     Calcium/VitaminD  Recent Labs   Lab Test 03/24/17  0953   UZMA 8.7     ESR/CRP  Recent Labs   Lab Test 01/20/23  0740 01/24/22  0914 07/26/21  1132   SED 7 4 5   CRP <3.00 <2.9 <2.9     Hepatitis B  Recent Labs   Lab Test 07/26/21  1131   HBCAB Nonreactive   HEPBANG Nonreactive     Hepatitis C  Recent Labs   Lab Test 07/26/21  1131   HCVAB Nonreactive     Tuberculosis Screening  Recent Labs   Lab Test 01/20/23  0740 07/26/21  1133 01/29/20  1038 10/10/17  1359   TBRES Negative Negative Negative  --    TBRSLT  --   --   --  Negative   TBAGN  --   --   --  0.00     Immunization History     Immunization History   Administered Date(s) Administered     COVID-19 Vaccine 12+ (Pfizer 2022) 01/24/2022     COVID-19 Vaccine 12+ (Pfizer) 04/26/2021, 05/17/2021     Influenza Vaccine >6 months (Alfuria,Fluzone) 10/10/2017, 01/29/2020, 11/24/2020, 01/24/2022     Pneumo Conj 13-V (2010&after) 01/21/2016     Pneumococcal 23 valent 10/10/2017     Tdap (Adacel,Boostrix) 04/27/2016          Chart documentation done in part with Dragon Voice recognition Software. Although reviewed after completion, some word and grammatical error may remain.    Bobby Middleton MD

## 2023-03-07 ENCOUNTER — TELEPHONE (OUTPATIENT)
Dept: RHEUMATOLOGY | Facility: CLINIC | Age: 33
End: 2023-03-07

## 2023-03-07 NOTE — TELEPHONE ENCOUNTER
[facility] Prior Authorization Team   Phone: [phone]  Fax: [phone]    PA Initiation    Medication: Humira  Insurance Company: [employer] [place] - Phone [phone] Fax [phone]  Pharmacy Filling the Rx: [facility] - [place], [place] - [address]  Filling Pharmacy Phone: [phone]  Filling Pharmacy Fax: [phone]  Start Date: 3/7/2023

## 2023-03-10 NOTE — TELEPHONE ENCOUNTER
Prior Authorization Approval    Authorization Effective Date: 3/7/2023  Authorization Expiration Date: 3/7/2024  Medication: Humira  Approved Dose/Quantity: 2/28 days  Reference #: [id number]   Insurance Company: [employer] [place] - Phone [phone] Fax [phone]  Expected CoPay: $5     CoPay Card Available: Yes    Foundation Assistance Needed:    Which Pharmacy is filling the prescription (Not needed for infusion/clinic administered): [facility] - [place], [place] - [address]  Pharmacy Notified: Yes  Patient Notified: Yes       Propranolol Counseling:  I discussed with the patient the risks of propranolol including but not limited to low heart rate, low blood pressure, low blood sugar, restlessness and increased cold sensitivity. They should call the office if they experience any of these side effects.

## 2023-05-06 ENCOUNTER — HEALTH MAINTENANCE LETTER (OUTPATIENT)
Age: 33
End: 2023-05-06

## 2023-07-24 ENCOUNTER — OFFICE VISIT (OUTPATIENT)
Dept: RHEUMATOLOGY | Facility: CLINIC | Age: 33
End: 2023-07-24
Payer: COMMERCIAL

## 2023-07-24 VITALS
HEART RATE: 71 BPM | BODY MASS INDEX: 28.83 KG/M2 | OXYGEN SATURATION: 97 % | WEIGHT: 189.6 LBS | SYSTOLIC BLOOD PRESSURE: 126 MMHG | DIASTOLIC BLOOD PRESSURE: 79 MMHG

## 2023-07-24 DIAGNOSIS — M45.7 ANKYLOSING SPONDYLITIS OF LUMBOSACRAL REGION (H): Primary | ICD-10-CM

## 2023-07-24 DIAGNOSIS — Z79.1 NSAID LONG-TERM USE: ICD-10-CM

## 2023-07-24 PROCEDURE — 99214 OFFICE O/P EST MOD 30 MIN: CPT | Performed by: INTERNAL MEDICINE

## 2023-07-24 RX ORDER — NAPROXEN 250 MG/1
250-500 TABLET ORAL 2 TIMES DAILY WITH MEALS
Qty: 360 TABLET | Refills: 1 | Status: SHIPPED | OUTPATIENT
Start: 2023-07-24

## 2023-07-24 NOTE — PATIENT INSTRUCTIONS
RHEUMATOLOGY    [facility]  [address]  [place] [address]    Phone number: [phone]  Fax number: [phone]    If you need a medication refill, please contact us as you may need lab work and/or a follow up visit prior to your refill.      Thank you for choosing [facility]!    Kassidy Dave CMA Rheumatology    -----------------------    If using naproxen more than 3 days per week, then get labs in late October.

## 2023-07-24 NOTE — PROGRESS NOTES
Rheumatology Clinic Visit      Dayday Buck MRN# [id number]   YOB: 1990 Age: 32 year old      Date of visit: 7/24/23   PCP: Lamin Zuniga    Chief Complaint   Patient presents with:  Ankylosing spondylitis    Assessment and Plan     1. Ankylosing Spondylitis: Diagnosed in 2014 by Dr. Tammi Murrieta, rheumatologist in [place], [place].  8/22/2014 [facility] MRI of the right hip showed sacroiliitis.  He has been on Humira since 2014 with resolution of his initial symptoms that included pain and stiffness of his bilateral hips, lower back, and neck.  He then had issues with Humira compliance, usually because he was not reordering his medication but since he has had his wife help with reordering he has been much better taking medication as prescribed.  8/9/2022 MRI of the pelvis showed degenerative changes at L5-S1 but no active sacroiliitis.  Does well as long as he takes Humira as prescribed; most recently with increased symptoms due to holding Humira for a couple months when there were viral illnesses going through his family.  Using naproxen now with benefit.  Anticipate improvement with continuing Humira now as prescribed.  Naproxen as needed.  If using naproxen more than 3 days/week and I advised that he check labs in 3 months in addition to labs in 6 months.   Chronic illness, progressive (due to not taking Humira for a period of time)  - Continue Humira 40mg SQ every 14 days    - Naproxen 250-500 mg BID PRN  - Labs in 3 months (if taking NSAIDs more than 3 days/week): CBC, Creatinine, Hepatic Panel  - Labs in 6 months: CBC, Creatinine, Hepatic Panel, ESR, CRP     2. Intermittent chronic nonradiating low back pain; lumbar spine DDD: Degenerative in nature.  Disc desiccation with shallow disc protrusion at L5-S1 seen on 8/9/2022 pelvic MRI.  He is doing well with home exercises; asymptomatic at this time.  Consider physical therapy in the future if needed.  Chronic illness    3.   Vaccinations: Vaccinations reviewed with Mr. Buck.  Risks and benefits of vaccinations were discussed.    - Influenza: Encouraged yearly vaccination; will receive today  - [id number]: up to date  - [id number]: up to date  - COVID-19: advised updating     Total minutes spent in evaluation with patient, documentation, , and review of pertinent studies and chart notes: 20     Mr. Buck verbalized agreement with and understanding of the rational for the diagnosis and treatment plan.  All questions were answered to best of my ability and the patient's satisfaction. Mr. Buck was advised to contact the clinic with any questions that may arise after the clinic visit.      Thank you for involving me in the care of the patient    Return to clinic: 6 months      HPI   Dayday Buck is a 32 year old male with a past medical history significant for ankylosing spondylitis who is seen for f/u of ankylosing spondylitis.    10/7/2016 rheumatology clinic note by Dr. Tammi Murrieta in [place], [place], documents HLA-B27 positive, sacroiliitis on MRI.  On Humira, naproxen PRN. Ankylosing Spondylitis.      2014 right hip MRI showed sacroiliitis    4/9/2019: Mr. Buck reported that he was doing great on Humira but he lost his job and insurance in December 2018 so has been off Humira since then.  He did well for about 2 months but then on month 2-4 he has noticed an increased use of naproxen.  He now uses naproxen 220-440 mg twice daily that is helpful for his lower back pain and stiffness.  Morning stiffness for about 1 hour that improves with activity and time and NSAIDs.  He would like to restart Humira.      10/8/2019: no showed to scheduled clinic visit    1/29/2020: doing well when taking humira every 2 weeks, but often late by 1-2 weeks and symptoms return when late.  Mostly back issues when more symptomatic, and morning stiffness for about 1 hour. Says that he will be better about taking Humira with the help from phone  reminders and his wife.     7/29/2020: doing better with taking Humira consistently; every 14 days.  Using naproxen 220mg BID PRN, about 2 days per week. More symptomatic for about 1 day prior to his dose of Humira, but very mildly increased achiness he says. Generally the achiness is just related to exercise.  He also notes having seasonal allergies, nasal congestion, and a postnasal drip but no cough.  He wonders what he should do for his allergies.    1/26/2021: dental work (crown) in Nov 2020 with pain since then and using naproxen for the tooth pain; hasn't followed up with his dentist.  AS is doing well without pain or stiffness.  Tolerating naproxen and Humira well. No GERD symptoms. No blood in urine or stool.     7/26/2021: Doing well at this time.  No joint pain or morning stiffness.  70% of the time he is taking Humira every 2 weeks, but occasionally he may take it on week #3 or week #4.  When he delays Humira sometimes he feels more achy and stiff.  As long as he takes Humira every 2 weeks and he has no joint pain or stiffness.  No joint swelling.  Has had some sinus congestion and is taking Claritin and Flonase at the direction of his PCP.    1/24/2022: baby born in August 2021 and when leaning back while holding the baby, bouncing and rocking the baby he feels like that aggravates his back. Also notices that arching his back in a short shower aggrevates his back.  Gained weight.  Has compressed his job into 7AM-1PM so that he can take care of his child in the morning.  His wife is stepping away from her job and that is a financial burden.  Lifting weights was routine prior to marriage, restarted weight lifting, but finding it hard to stay motivated to exercise. Then minor cold last week.  Humira compliance has been better but not perfect. Using naproxen 220mg 0-1x/day.  Feels like overall his back pain and stiffness is worse in the morning and improves with time and activity.  Would like to try taking  Humira every 7 days to see if he would have more benefit, but he does not want to try different medication if this frequency Humira is denied by his insurance and would rather than change back to Humira every 14 days.  His arthritis is not limiting any of his daily activities.    7/25/2022: Inflammatory Tritus is currently doing well.  Tolerating Humira that he says he takes every 14 days without missed doses now.  Morning stiffness for about 5-10 minutes.  Sometimes with low back pain that is worse with activity and improves with rest; pain does not radiate down the legs.  He notes that a friend he has is having serial monitoring with x-rays due to ankylosing spondylitis and he would like to have repeat imaging of his SI joints where he has the intermittent pain.     1/24/2023: low back ache when putting on shoes several months ago, but not now.  Dec 2022 COVID-19 infection so held Humira x2 weeks without worsening symptoms. 90% of the time takes Humira on time; other times may be delayed due to forgetting to order Humira.  Has been exercising more regularly and started running again, with resolution of the low back pain.  No morning stiffness.  No eye pain or redness, but sometimes with a sandpaper feeling in his eyes/feels dry.    Today, 7/24/2023: Viral illnesses going throughout his household so he held Humira for a couple months with subsequently worsening low back pain that is worse in the morning and improves with time and activity.  Morning stiffness for at least 1 hour.  This past weekend he started using naproxen 440 mg twice daily with significant improvement and he will continue this for the next 1-2 weeks.  No black or bloody stools.  No eye pain or redness.  Feels like he is improving since restarting Humira as prescribed and using naproxen.    Denies fevers, chills, nausea, vomiting, constipation, diarrhea. No abdominal pain. No chest pain/pressure, palpitations, or shortness of breath. No LE  swelling. No neck pain. No oral or nasal sores.  No sicca symptoms. No photosensitivity or photophobia.     Tobacco: none  EtOH: 1 drink per week  Drugs: none  Occupation: Was a  at the [employer]; now working at an [employer]    ROS   12 point review of system was completed and negative except as noted in the HPI     Active Problem List     Patient Active Problem List   Diagnosis     AS (ankylosing spondylitis) (H)     Acne vulgaris     Vegan diet     Past Medical History     Past Medical History:   Diagnosis Date     Acne      AS (ankylosing spondylitis) (H)      Dyspepsia      Past Surgical History     Past Surgical History:   Procedure Laterality Date     HEAD & NECK SURGERY      wisdom tooth extraction     ORTHOPEDIC SURGERY      3 arthroscopic knee surgery for ACL repair     Allergy     No Known Allergies  Current Medication List     Current Outpatient Medications   Medication Sig     adalimumab (HUMIRA *CF*) 40 MG/0.4ML prefilled syringe kit Inject 0.4 mLs (40 mg) Subcutaneous every 14 days . Hold for signs of infection, then seek medical attention.     azelastine (ASTELIN) 0.1 % nasal spray Spray 2 sprays into both nostrils 2 times daily     naproxen sodium (ANAPROX) 220 MG tablet Take 1 tablet (220 mg) by mouth 2 times daily as needed for moderate pain     fluticasone (FLONASE) 50 MCG/ACT nasal spray Spray 2 sprays into both nostrils 2 times daily (Patient not taking: Reported on 11/2/2021)     oxymetazoline (AFRIN) 0.05 % nasal spray Spray 2 sprays into both nostrils 2 times daily     No current facility-administered medications for this visit.     Social History   See HPI    Family History     Family History   Problem Relation Age of Onset     Family History Negative Mother      Cancer Father         skin cancer basal cell     Family History Negative Brother      Family History Negative Sister      Family History Negative Sister      Breast Cancer Maternal Aunt      No FHx of  "rheumatologic disease    Physical Exam     Temp Readings from Last 3 Encounters:   11/24/20 97.3  F (36.3  C) (Tympanic)   04/09/19 98.7  F (37.1  C) (Oral)   04/01/19 98.5  F (36.9  C) (Oral)     BP Readings from Last 5 Encounters:   07/24/23 126/79   01/24/23 120/76   07/25/22 131/80   01/24/22 127/79   11/02/21 (!) 142/92     Pulse Readings from Last 1 Encounters:   07/24/23 71     Resp Readings from Last 1 Encounters:   04/09/19 16     Estimated body mass index is 28.83 kg/m  as calculated from the following:    Height as of 1/24/22: 1.727 m (5' 8\").    Weight as of this encounter: 86 kg (189 lb 9.6 oz).    GEN: NAD. Healthy appearing adult.   HEENT:  Anicteric, noninjected sclera. No obvious external lesions of the ear and nose. Hearing intact.  CV: S1, S2. RRR. No m/r/g  PULM: No increased work of breathing. CTA bilaterally   MSK: MCPs, PIPs, DIPs without swelling or tenderness to palpation.  Wrists without swelling or tenderness to palpation.  Elbows and shoulders without swelling or tenderness to palpation.  Knees, ankles, and MTPs without swelling or tenderness to palpation.    SKIN: No rash or jaundice seen  PSYCH: Alert. Appropriate.      Labs / Imaging (select studies)       CBC  Recent Labs   Lab Test 01/20/23  0740 01/24/22  0914 07/26/21  1132 07/24/20  0826 01/29/20  1038 04/09/19  1328   WBC 6.3 6.9 5.9 5.5 6.6 7.2   RBC 5.05 5.04 4.70 4.71 4.65 4.89   HGB 15.0 15.3 14.5 14.8 14.1 15.1   HCT 43.6 43.6 40.7 40.7 40.0 41.9   MCV 86 87 87 86 86 86   RDW 11.5 11.5 11.8 11.9 11.9 11.7    258 231 218 235 258   MCH 29.7 30.4 30.9 31.4 30.3 30.9   MCHC 34.4 35.1 35.6 36.4 35.3 36.0   NEUTROPHIL 49 48 45 40.9 46.0 53.7   LYMPH 34 32 36 34.4 38.1 32.0   MONOCYTE 9 14 11 10.7 10.0 10.0   EOSINOPHIL 7 6 7 13.5 5.3 4.0   BASOPHIL 1 0 0 0.5 0.6 0.3   ANEU  --   --   --  2.3 3.0 3.9   ALYM  --   --   --  1.9 2.5 2.3   EPI  --   --   --  0.6 0.7 0.7   AEOS  --   --   --  0.7 0.4 0.3   ABAS  --   --   --  " 0.0 0.0 0.0   ANEUTAUTO 3.1 3.4 2.7  --   --   --    ALYMPAUTO 2.1 2.2 2.1  --   --   --    AMONOAUTO 0.6 1.0 0.7  --   --   --    AEOSAUTO 0.5 0.4 0.4  --   --   --    ABSBASO 0.1 0.0 0.0  --   --   --      CMP  Recent Labs   Lab Test 01/20/23  0740 01/24/22  0914 07/26/21  1132 11/24/20  1417 07/24/20  0826 01/29/20  1038 04/09/19  1328 10/10/17  1359 03/24/17  0953   NA  --   --   --   --   --   --   --   --  139   POTASSIUM  --   --   --   --   --   --   --   --  4.1   CHLORIDE  --   --   --   --   --   --   --   --  104   CO2  --   --   --   --   --   --   --   --  29   ANIONGAP  --   --   --   --   --   --   --   --  6   GLC  --  100*  --  69*  --   --   --   --  80   BUN  --   --   --   --   --   --   --   --  13   CR 0.93 0.93 0.78  --  0.93 0.84 1.04   < > 0.82   GFRESTIMATED >90 >90 >90  --  >90 >90 >90   < > >90  Non  GFR Calc     GFRESTBLACK  --   --   --   --  >90 >90 >90   < > >90  African American GFR Calc     UZMA  --   --   --   --   --   --   --   --  8.7   BILITOTAL 0.6 0.5 0.6  --  0.9 0.4 0.6   < > 0.5   ALBUMIN 4.7 4.3 4.4  --  4.4 4.5 4.6   < > 4.3   PROTTOTAL 7.7 7.6 7.4  --  8.1 7.8 8.1   < > 7.6   ALKPHOS 66 67 55  --  63 54 64   < > 71   AST 28 25 25  --  29 22 23   < > 24   ALT 24 38 31  --  35 24 31   < > 29    < > = values in this interval not displayed.     Calcium/VitaminD  Recent Labs   Lab Test 03/24/17  0953   UZMA 8.7     ESR/CRP  Recent Labs   Lab Test 01/20/23  0740 01/24/22  0914 07/26/21  1132 07/24/20  0826   SED 7 4 5 5   CRP  --  <2.9 <2.9 <2.9   CRPI <3.00  --   --   --        Hepatitis B  Recent Labs   Lab Test 07/26/21  1131   HBCAB Nonreactive   HEPBANG Nonreactive     Hepatitis C  Recent Labs   Lab Test 07/26/21  1131   HCVAB Nonreactive     Tuberculosis Screening  Recent Labs   Lab Test 01/20/23  0740 07/26/21  1133 01/29/20  1038 10/10/17  1359   TBRES Negative Negative Negative  --    TBRSLT  --   --   --  Negative   TBAGN  --   --   --  0.00        Immunization History     Immunization History   Administered Date(s) Administered     COVID-19 MONOVALENT 12+ (Pfizer) 04/26/2021, 05/17/2021     COVID-19 Monovalent 12+ (Pfizer 2022) 01/24/2022     Influenza Vaccine >6 months (Alfuria,Fluzone) 10/10/2017, 01/29/2020, 11/24/2020, 01/24/2022, 01/24/2023     Pneumo Conj 13-V (2010&after) 01/21/2016     Pneumococcal 23 valent 10/10/2017     TDAP (Adacel,Boostrix) 04/27/2016          Chart documentation done in part with Dragon Voice recognition Software. Although reviewed after completion, some word and grammatical error may remain.    Bobby Middleton MD

## 2023-12-23 NOTE — NURSING NOTE
RAPID3 (0-30) Cumulative Score  8.2          RAPID3 Weighted Score (divide #4 by 3 and that is the weighted score)  2.7            Dl Regan is a 22 m.o. male on day 9 of admission presenting with Respiratory failure (CMS/HCC).      Subjective   22 month old with swollen cerebellum and increased ICP of uncertain tiology       Objective     Vitals 24 hour ranges:  Temp:  [35.1 °C (95.2 °F)-37.7 °C (99.9 °F)] 36.2 °C (97.2 °F)  Heart Rate:  [70-96] 80  Resp:  [23-25] 23  SpO2:  [90 %-99 %] 93 %  Arterial Line BP 1: ()/(47-69) 104/52  Medical Gas Therapy: Supplemental oxygen  O2 Delivery Method: Endotracheal tube  FiO2 (%): 35 %  [place]do Assessment of Pediatric Delirium Score: 24  Intake/Output last 3 Shifts:    Intake/Output Summary (Last 24 hours) at 12/23/2023 1540  Last data filed at 12/23/2023 1400  Gross per 24 hour   Intake 1528.26 ml   Output 1730 ml   Net -201.74 ml       LDA:  CVC 12/15/23 Triple lumen Non-tunneled Right Femoral (Active)   Placement Date/Time: 12/15/23 0300   Hand Hygiene Performed Prior to CVC Insertion: Yes  Site Prep: Usual sterile procedure followed  Site Prep Agent has Completely Dried Before Insertion: Yes  All 5 Sterile Barriers Used (Gloves, Gown, Cap, Mask, Lar...   Number of days: 8       Peripheral IV 12/14/23 22 G Right (Active)   Placement Date/Time: 12/14/23 1757   Size (Gauge): 22 G  Orientation: Right  Location: Antecubital   Number of days: 8       Arterial Line 12/14/23 Left Radial (Active)   Placement Date/Time: 12/14/23 2300   Hand Hygiene Completed: Yes  Orientation: Left  Location: Radial  Site Prep: Usual sterile procedure followed  Technique: Guidewire   Number of days: 8       ETT  (Active)   Placement Date/Time: 12/14/23 1747   Location: Oral   Number of days: 8       Urethral Catheter 8 Fr. (Active)   Placement Date/Time: 12/14/23 2100   Placed by: Andreina Da Silva RN  Hand Hygiene Completed: Yes  Tube Size (Fr.): 8 Fr.  Catheter Balloon Size: 3 mL  Urine Returned: Yes   Number of days: 8       NG/OG/Feeding Tube NG - [place] sump  Right nostril 8 Fr. (Active)   Placement Date/Time:  12/17/23 1200   Hand Hygiene Completed: Yes  Type of Tube: Feeding Tube  Tube Type: NG - [place] sump  NG/OG Tube Size: (c)   Tube Location: Right nostril  Tube Size (Fr.): 8 Fr.   Number of days: 6       Intracranial Pressure/Ventriculostomy Ventricular drainage catheter with ICP transducer  (Active)   Placement Date/Time: 12/14/23 0000   Hand Hygiene Completed: Yes  Ventricular Device: Ventricular drainage catheter with ICP transducer  Orientation: (c)    Number of days: 9        Vent settings:  Vent Mode: Synchronized intermittent mandatory ventilation/pressure regulated volume control  FiO2 (%):  [35 %] 35 %  S RR:  [23] 23  S VT:  [115 mL] 115 mL  PEEP/CPAP (cm H2O):  [6 cm H20] 6 cm H20  SC SUP:  [5 cm H20] 5 cm H20  MAP (cm H2O):  [10] 10    Physical Exam:  CNS: The patient is unchanged, pupils remain unequal and without response, ICPs in the low teens without manipulation. Have stopped NM blockade earlier this morning but still no movements. EVD in place. MRI last night with largely unchanged cerebellar swelling (possibly small reduction in size, but subtle if at all). Neuroprotection measures: normal temp, normal CO2, Na 140-150, sedation, AEDs. On HD steroids x 5 days per Neurology request     Resp: Remains on low setting PRVC, clear lungs and normal oxygenation     CV: Hemodynamics are normal without need for intervnetion.    FENGI: And soft. On increasing feeds and tolerating.     Renal: Normal intrinsic function     Endo: Had 3 hr period last night of high urine output )10 ml/kg/hr)  with increase in Na to high 140s, raising concern of DI, but resolved without intervention with stable ;subsequent Na    ID: On  no antiinfectives      Medications  bacitracin, 1 Application, Topical, Daily  erythromycin, 1 cm, Both Eyes, q4h  [Held by provider] furosemide, 0.5 mg/kg (Dosing Weight), intravenous, q12h RACHEL  levETIRAcetam, 20 mg/kg (Dosing Weight), intravenous, q12h  methylPREDNISolone sodium succinate (PF), 20  mg/kg (Dosing Weight), intravenous, q24h  moxifloxacin, 1 drop, Both Eyes, 4x daily  pantoprazole, 1 mg/kg (Dosing Weight), intravenous, Daily  polyethylene glycol, 8.5 g, nasogastric tube, Daily  senna, 8.8 mg, nasogastric tube, Daily  white petrolatum-mineral oiL, 1 Application, Both Eyes, q4h RACHEL      fentaNYL, 2 mcg/kg/hr (Dosing Weight), Last Rate: 2 mcg/kg/hr (12/23/23 1502)  heparin-papaverine, 3 mL/hr, Last Rate: 3 mL/hr (12/23/23 1300)  midazolam, 0.4 mg/kg/hr (Dosing Weight), Last Rate: 0.4 mg/kg/hr (12/23/23 1300)  Pediatric Custom Fluids 1000 mL, 15 mL/hr, Last Rate: 15 mL/hr (12/23/23 1300)  sodium chloride 0.9% 50 mL infusion syringe, 1 mL/hr, Last Rate: 1 mL/hr (12/23/23 0113)  sodium chloride 0.9%, 1 mL/hr, Last Rate: Stopped (12/22/23 0000)      PRN medications: fentaNYL, glycerin, midazolam, oxygen, sodium chloride    Lab Results  Results for orders placed or performed during the hospital encounter of 12/14/23 (from the past 24 hour(s))   Renal Function Panel   Result Value Ref Range    Glucose 113 (H) 60 - 99 mg/dL    Sodium 149 (H) 136 - 145 mmol/L    Potassium 3.6 3.3 - 4.7 mmol/L    Chloride 114 (H) 98 - 107 mmol/L    Bicarbonate 22 18 - 27 mmol/L    Anion Gap 17 10 - 30 mmol/L    Urea Nitrogen 5 (L) 6 - 23 mg/dL    Creatinine <0.20 0.10 - 0.50 mg/dL    eGFR      Calcium 8.0 (L) 8.5 - 10.7 mg/dL    Phosphorus 5.6 3.1 - 6.7 mg/dL    Albumin 2.5 (L) 3.4 - 4.7 g/dL   Magnesium   Result Value Ref Range    Magnesium 1.81 1.60 - 2.40 mg/dL   Osmolality, urine   Result Value Ref Range    Osmolality, Urine Random 497 50 - 600 mOsm/kg   Urine electrolytes   Result Value Ref Range    Sodium, Urine Random 108 mmol/L    Sodium/Creatinine Ratio 340 Not established. mmol/g Creat    Potassium, Urine Random 113 mmol/L    Potassium/Creatinine Ratio 355 Not established mmol/g Creat    Chloride, Urine Random 110 mmol/L    Chloride/Creatinine Ratio 346 (H) 23 - 275 mmol/g creat    Creatinine, Urine Random 31.8 2.0  - 128.0 mg/dL   Magnesium   Result Value Ref Range    Magnesium 1.84 1.60 - 2.40 mg/dL   Osmolality   Result Value Ref Range    Osmolality, Serum 302 (H) 280 - 300 mOsm/kg   Renal Function Panel   Result Value Ref Range    Glucose 146 (H) 60 - 99 mg/dL    Sodium 145 136 - 145 mmol/L    Potassium 4.0 3.3 - 4.7 mmol/L    Chloride 110 (H) 98 - 107 mmol/L    Bicarbonate 23 18 - 27 mmol/L    Anion Gap 16 10 - 30 mmol/L    Urea Nitrogen 5 (L) 6 - 23 mg/dL    Creatinine <0.20 0.10 - 0.50 mg/dL    eGFR      Calcium 8.3 (L) 8.5 - 10.7 mg/dL    Phosphorus 6.1 3.1 - 6.7 mg/dL    Albumin 2.7 (L) 3.4 - 4.7 g/dL   Renal Function Panel   Result Value Ref Range    Glucose 157 (H) 60 - 99 mg/dL    Sodium 143 136 - 145 mmol/L    Potassium 3.8 3.3 - 4.7 mmol/L    Chloride 106 98 - 107 mmol/L    Bicarbonate 26 18 - 27 mmol/L    Anion Gap 15 10 - 30 mmol/L    Urea Nitrogen 7 6 - 23 mg/dL    Creatinine <0.20 0.10 - 0.50 mg/dL    eGFR      Calcium 8.9 8.5 - 10.7 mg/dL    Phosphorus 5.5 3.1 - 6.7 mg/dL    Albumin 2.8 (L) 3.4 - 4.7 g/dL   BLOOD GAS ARTERIAL FULL PANEL   Result Value Ref Range    POCT pH, Arterial 7.50 (H) 7.38 - 7.42 pH    POCT pCO2, Arterial 37 (L) 38 - 42 mm Hg    POCT pO2, Arterial 62 (L) 85 - 95 mm Hg    POCT SO2, Arterial 93 (L) 94 - 100 %    POCT Oxy Hemoglobin, Arterial 89.7 (L) 94.0 - 98.0 %    POCT Hematocrit Calculated, Arterial 31.0 (L) 33.0 - 39.0 %    POCT Sodium, Arterial 141 136 - 145 mmol/L    POCT Potassium, Arterial 3.6 3.3 - 4.7 mmol/L    POCT Chloride, Arterial 107 98 - 107 mmol/L    POCT Ionized Calcium, Arterial 1.20 1.10 - 1.33 mmol/L    POCT Glucose, Arterial 165 (H) 60 - 99 mg/dL    POCT Lactate, Arterial 1.5 1.0 - 2.4 mmol/L    POCT Base Excess, Arterial 5.4 (H) -2.0 - 3.0 mmol/L    POCT HCO3 Calculated, Arterial 28.9 (H) 22.0 - 26.0 mmol/L    POCT Hemoglobin, Arterial 10.2 (L) 10.5 - 13.5 g/dL    POCT Anion Gap, Arterial 9 (L) 10 - 25 mmo/L    Patient Temperature 37.0 degrees Celsius    FiO2 35 %    Renal Function Panel   Result Value Ref Range    Glucose 150 (H) 60 - 99 mg/dL    Sodium 143 136 - 145 mmol/L    Potassium 3.8 3.3 - 4.7 mmol/L    Chloride 106 98 - 107 mmol/L    Bicarbonate 25 18 - 27 mmol/L    Anion Gap 16 10 - 30 mmol/L    Urea Nitrogen 8 6 - 23 mg/dL    Creatinine <0.20 0.10 - 0.50 mg/dL    eGFR      Calcium 8.7 8.5 - 10.7 mg/dL    Phosphorus 4.6 3.1 - 6.7 mg/dL    Albumin 2.9 (L) 3.4 - 4.7 g/dL   Magnesium   Result Value Ref Range    Magnesium 2.21 1.60 - 2.40 mg/dL   BLOOD GAS ARTERIAL FULL PANEL   Result Value Ref Range    POCT pH, Arterial 7.54 (H) 7.38 - 7.42 pH    POCT pCO2, Arterial 35 (L) 38 - 42 mm Hg    POCT pO2, Arterial 74 (L) 85 - 95 mm Hg    POCT SO2, Arterial 97 94 - 100 %    POCT Oxy Hemoglobin, Arterial 94.7 94.0 - 98.0 %    POCT Hematocrit Calculated, Arterial 30.0 (L) 33.0 - 39.0 %    POCT Sodium, Arterial 140 136 - 145 mmol/L    POCT Potassium, Arterial 3.5 3.3 - 4.7 mmol/L    POCT Chloride, Arterial 107 98 - 107 mmol/L    POCT Ionized Calcium, Arterial 1.18 1.10 - 1.33 mmol/L    POCT Glucose, Arterial 172 (H) 60 - 99 mg/dL    POCT Lactate, Arterial 1.8 1.0 - 2.4 mmol/L    POCT Base Excess, Arterial 7.0 (H) -2.0 - 3.0 mmol/L    POCT HCO3 Calculated, Arterial 29.9 (H) 22.0 - 26.0 mmol/L    POCT Hemoglobin, Arterial 9.9 (L) 10.5 - 13.5 g/dL    POCT Anion Gap, Arterial 7 (L) 10 - 25 mmo/L    Patient Temperature 37.0 degrees Celsius    FiO2 35 %   CBC and Auto Differential   Result Value Ref Range    WBC 11.8 6.0 - 17.5 x10*3/uL    nRBC 0.3 (H) 0.0 - 0.0 /100 WBCs    RBC 3.61 (L) 3.70 - 5.30 x10*6/uL    Hemoglobin 9.7 (L) 10.5 - 13.5 g/dL    Hematocrit 29.1 (L) 33.0 - 39.0 %    MCV 81 70 - 86 fL    MCH 26.9 23.0 - 31.0 pg    MCHC 33.3 31.0 - 37.0 g/dL    RDW 14.5 11.5 - 14.5 %    Platelets 426 (H) 150 - 400 x10*3/uL    Immature Granulocytes %, Automated 1.2 (H) 0.0 - 1.0 %    Immature Granulocytes Absolute, Automated 0.14 0.00 - 0.15 x10*3/uL   Manual Differential   Result  Value Ref Range    Neutrophils %, Manual 81.6 14.0 - 35.0 %    Bands %, Manual 2.6 5.0 - 11.0 %    Lymphocytes %, Manual 14.0 40.0 - 76.0 %    Monocytes %, Manual 0.9 3.0 - 9.0 %    Eosinophils %, Manual 0.0 0.0 - 5.0 %    Basophils %, Manual 0.0 0.0 - 1.0 %    Myelocytes %, Manual 0.9 0.0 - 0.0 %    Seg Neutrophils Absolute, Manual 9.63 (H) 1.00 - 4.00 x10*3/uL    Bands Absolute, Manual 0.31 (L) 0.80 - 1.80 x10*3/uL    Lymphocytes Absolute, Manual 1.65 (L) 3.00 - 10.00 x10*3/uL    Monocytes Absolute, Manual 0.11 0.10 - 1.50 x10*3/uL    Eosinophils Absolute, Manual 0.00 0.00 - 0.80 x10*3/uL    Basophils Absolute, Manual 0.00 0.00 - 0.10 x10*3/uL    Myelocytes Absolute, Manual 0.11 0.00 - 0.00 x10*3/uL    Total Cells Counted 114     Neutrophils Absolute, Manual 9.94 (H) 1.00 - 7.00 x10*3/uL    RBC Morphology No significant RBC morphology present    BLOOD GAS ARTERIAL FULL PANEL   Result Value Ref Range    POCT pH, Arterial 7.48 (H) 7.38 - 7.42 pH    POCT pCO2, Arterial 38 38 - 42 mm Hg    POCT pO2, Arterial 74 (L) 85 - 95 mm Hg    POCT SO2, Arterial 96 94 - 100 %    POCT Oxy Hemoglobin, Arterial 93.7 (L) 94.0 - 98.0 %    POCT Hematocrit Calculated, Arterial 31.0 (L) 33.0 - 39.0 %    POCT Sodium, Arterial 141 136 - 145 mmol/L    POCT Potassium, Arterial 3.1 (L) 3.3 - 4.7 mmol/L    POCT Chloride, Arterial 109 (H) 98 - 107 mmol/L    POCT Ionized Calcium, Arterial 1.23 1.10 - 1.33 mmol/L    POCT Glucose, Arterial 163 (H) 60 - 99 mg/dL    POCT Lactate, Arterial 1.5 1.0 - 2.4 mmol/L    POCT Base Excess, Arterial 4.5 (H) -2.0 - 3.0 mmol/L    POCT HCO3 Calculated, Arterial 28.3 (H) 22.0 - 26.0 mmol/L    POCT Hemoglobin, Arterial 10.2 (L) 10.5 - 13.5 g/dL    POCT Anion Gap, Arterial 7 (L) 10 - 25 mmo/L    Patient Temperature 37.0 degrees Celsius    FiO2 35 %   Renal Function Panel   Result Value Ref Range    Glucose 144 (H) 60 - 99 mg/dL    Sodium 146 (H) 136 - 145 mmol/L    Potassium 3.5 3.3 - 4.7 mmol/L    Chloride 108 (H)  98 - 107 mmol/L    Bicarbonate 27 18 - 27 mmol/L    Anion Gap 15 10 - 30 mmol/L    Urea Nitrogen 7 6 - 23 mg/dL    Creatinine <0.20 0.10 - 0.50 mg/dL    eGFR      Calcium 9.0 8.5 - 10.7 mg/dL    Phosphorus 4.2 3.1 - 6.7 mg/dL    Albumin 3.1 (L) 3.4 - 4.7 g/dL   BLOOD GAS ARTERIAL FULL PANEL   Result Value Ref Range    POCT pH, Arterial 7.45 (H) 7.38 - 7.42 pH    POCT pCO2, Arterial 38 38 - 42 mm Hg    POCT pO2, Arterial 66 (L) 85 - 95 mm Hg    POCT SO2, Arterial 94 94 - 100 %    POCT Oxy Hemoglobin, Arterial 91.4 (L) 94.0 - 98.0 %    POCT Hematocrit Calculated, Arterial 29.0 (L) 33.0 - 39.0 %    POCT Sodium, Arterial 140 136 - 145 mmol/L    POCT Potassium, Arterial 3.7 3.3 - 4.7 mmol/L    POCT Chloride, Arterial 110 (H) 98 - 107 mmol/L    POCT Ionized Calcium, Arterial 1.24 1.10 - 1.33 mmol/L    POCT Glucose, Arterial 152 (H) 60 - 99 mg/dL    POCT Lactate, Arterial 1.3 1.0 - 2.4 mmol/L    POCT Base Excess, Arterial 2.3 -2.0 - 3.0 mmol/L    POCT HCO3 Calculated, Arterial 26.4 (H) 22.0 - 26.0 mmol/L    POCT Hemoglobin, Arterial 9.7 (L) 10.5 - 13.5 g/dL    POCT Anion Gap, Arterial 7 (L) 10 - 25 mmo/L    Patient Temperature 37.0 degrees Celsius    FiO2 35 %   Renal Function Panel   Result Value Ref Range    Glucose 131 (H) 60 - 99 mg/dL    Sodium 144 136 - 145 mmol/L    Potassium 3.9 3.3 - 4.7 mmol/L    Chloride 109 (H) 98 - 107 mmol/L    Bicarbonate 25 18 - 27 mmol/L    Anion Gap 14 10 - 30 mmol/L    Urea Nitrogen 9 6 - 23 mg/dL    Creatinine <0.20 0.10 - 0.50 mg/dL    eGFR      Calcium 8.8 8.5 - 10.7 mg/dL    Phosphorus 4.1 3.1 - 6.7 mg/dL    Albumin 3.0 (L) 3.4 - 4.7 g/dL   Magnesium   Result Value Ref Range    Magnesium 2.23 1.60 - 2.40 mg/dL     Results from last 7 days   Lab Units 12/23/23  1404   POCT PH, ARTERIAL pH 7.45*   POCT PCO2, ARTERIAL mm Hg 38   POCT PO2, ARTERIAL mm Hg 66*   POCT HCO3 CALCULATED, ARTERIAL mmol/L 26.4*   POCT BASE EXCESS, ARTERIAL mmol/L 2.3     .            Assessment/Plan      Principal Problem:    Respiratory failure (CMS/HCC)  Active Problems:    Cerebral infarction (CMS/HCC)      Neurology:   Serial assessment per PICU protocol  Cont cerebro-protection as noted above, with CPP goals now at 45-5o and off NM blockade  HD steroids x 5 days     Cardiovascular:   Serial assessment per PICU protocol    Pulmonary:   Serial assessment per PICU protocol  Maintain current low setting PRVC support     FEN/GI:   Enteral nutrition. Even fluid balance     Renal:   Strict I/O    Endo:   Monitor for further evidence of DI    Hematology/ID: No current issues for either system    Social:   Mother sleeping during my eval but no current issues           I have reviewed and evaluated the most recent data and results, personally examined the patient, and formulated the plan of care as presented above. This patient was critically ill and required continued critical care treatment. Teaching and any separately billable procedures are not included in the time calculation.    Billing Provider Critical Care Time: 50 minutes    Luis Bauman MD

## 2024-01-18 ENCOUNTER — LAB (OUTPATIENT)
Dept: LAB | Facility: CLINIC | Age: 34
End: 2024-01-18
Payer: COMMERCIAL

## 2024-01-18 DIAGNOSIS — M45.7 ANKYLOSING SPONDYLITIS OF LUMBOSACRAL REGION (H): ICD-10-CM

## 2024-01-18 DIAGNOSIS — Z79.1 NSAID LONG-TERM USE: ICD-10-CM

## 2024-01-18 LAB
BASOPHILS # BLD AUTO: 0.1 10E3/UL (ref 0–0.2)
BASOPHILS NFR BLD AUTO: 1 %
EOSINOPHIL # BLD AUTO: 0.6 10E3/UL (ref 0–0.7)
EOSINOPHIL NFR BLD AUTO: 7 %
ERYTHROCYTE [DISTWIDTH] IN BLOOD BY AUTOMATED COUNT: 11.5 % (ref 10–15)
ERYTHROCYTE [SEDIMENTATION RATE] IN BLOOD BY WESTERGREN METHOD: 8 MM/HR (ref 0–15)
HCT VFR BLD AUTO: 44.8 % (ref 40–53)
HGB BLD-MCNC: 15.2 G/DL (ref 13.3–17.7)
IMM GRANULOCYTES # BLD: 0 10E3/UL
IMM GRANULOCYTES NFR BLD: 0 %
LYMPHOCYTES # BLD AUTO: 3.1 10E3/UL (ref 0.8–5.3)
LYMPHOCYTES NFR BLD AUTO: 38 %
MCH RBC QN AUTO: 29.6 PG (ref 26.5–33)
MCHC RBC AUTO-ENTMCNC: 33.9 G/DL (ref 31.5–36.5)
MCV RBC AUTO: 87 FL (ref 78–100)
MONOCYTES # BLD AUTO: 0.8 10E3/UL (ref 0–1.3)
MONOCYTES NFR BLD AUTO: 9 %
NEUTROPHILS # BLD AUTO: 3.7 10E3/UL (ref 1.6–8.3)
NEUTROPHILS NFR BLD AUTO: 45 %
PLATELET # BLD AUTO: 292 10E3/UL (ref 150–450)
RBC # BLD AUTO: 5.13 10E6/UL (ref 4.4–5.9)
WBC # BLD AUTO: 8.3 10E3/UL (ref 4–11)

## 2024-01-18 PROCEDURE — 85025 COMPLETE CBC W/AUTO DIFF WBC: CPT

## 2024-01-18 PROCEDURE — 36415 COLL VENOUS BLD VENIPUNCTURE: CPT

## 2024-01-18 PROCEDURE — 85652 RBC SED RATE AUTOMATED: CPT

## 2024-01-18 PROCEDURE — 80076 HEPATIC FUNCTION PANEL: CPT

## 2024-01-18 PROCEDURE — 82565 ASSAY OF CREATININE: CPT

## 2024-01-18 PROCEDURE — 86140 C-REACTIVE PROTEIN: CPT

## 2024-01-19 LAB
ALBUMIN SERPL BCG-MCNC: 4.7 G/DL (ref 3.5–5.2)
ALP SERPL-CCNC: 60 U/L (ref 40–150)
ALT SERPL W P-5'-P-CCNC: 27 U/L (ref 0–70)
AST SERPL W P-5'-P-CCNC: 31 U/L (ref 0–45)
BILIRUB DIRECT SERPL-MCNC: <0.2 MG/DL (ref 0–0.3)
BILIRUB SERPL-MCNC: 0.5 MG/DL
CREAT SERPL-MCNC: 0.92 MG/DL (ref 0.67–1.17)
CRP SERPL-MCNC: <3 MG/L
EGFRCR SERPLBLD CKD-EPI 2021: >90 ML/MIN/1.73M2
PROT SERPL-MCNC: 7.7 G/DL (ref 6.4–8.3)

## 2024-01-22 ENCOUNTER — OFFICE VISIT (OUTPATIENT)
Dept: RHEUMATOLOGY | Facility: CLINIC | Age: 34
End: 2024-01-22
Payer: COMMERCIAL

## 2024-01-22 VITALS
OXYGEN SATURATION: 97 % | DIASTOLIC BLOOD PRESSURE: 78 MMHG | SYSTOLIC BLOOD PRESSURE: 129 MMHG | WEIGHT: 197 LBS | HEART RATE: 62 BPM | BODY MASS INDEX: 29.95 KG/M2

## 2024-01-22 DIAGNOSIS — R21 RASH: ICD-10-CM

## 2024-01-22 DIAGNOSIS — M45.7 ANKYLOSING SPONDYLITIS OF LUMBOSACRAL REGION (H): ICD-10-CM

## 2024-01-22 DIAGNOSIS — Z23 NEED FOR PROPHYLACTIC VACCINATION AND INOCULATION AGAINST INFLUENZA: ICD-10-CM

## 2024-01-22 DIAGNOSIS — R35.89 POLYURIA: Primary | ICD-10-CM

## 2024-01-22 PROCEDURE — 99214 OFFICE O/P EST MOD 30 MIN: CPT | Performed by: INTERNAL MEDICINE

## 2024-01-22 NOTE — PROGRESS NOTES
Rheumatology Clinic Visit      Dayday Buck MRN# [id number]   YOB: 1990 Age: 33 year old      Date of visit: 1/22/24   PCP: Lamin Zuniga    Chief Complaint   Patient presents with:  RECHECK: 6 month follow up Ankylosing Spondylitis    Assessment and Plan     1. Ankylosing Spondylitis: Diagnosed in 2014 by Dr. Tammi Murrieta, rheumatologist in [place], [place].  8/22/2014 [facility] MRI of the right hip showed sacroiliitis.  He has been on Humira since 2014 with resolution of his initial symptoms that included pain and stiffness of his bilateral hips, lower back, and neck.  He then had issues with Humira compliance, usually because he was not reordering his medication but since he has had his wife help with reordering he has been much better taking medication as prescribed.  8/9/2022 MRI of the pelvis showed degenerative changes at L5-S1 but no active sacroiliitis.  Does well as long as he takes Humira as prescribed; most recently with increased symptoms due to holding Humira for a couple months when there were viral illnesses going through his family.  Using naproxen now with benefit.  Anticipate improvement with continuing Humira now as prescribed.  Naproxen as needed.  If using naproxen more than 3 days/week and I advised that he check labs in 3 months in addition to labs in 6 months.   Chronic illness, progressive (due to not taking Humira for a period of time)  - Continue Humira 40mg SQ every 14 days    - Naproxen 250-500 mg BID PRN  - No rheumatology labs (other than fasting glucose as noted in #3) prior to 6-month rheumatology follow-up, unless he is using naproxen more than 3 days/week and if so then he should notify this clinic for lab orders    2. Intermittent chronic nonradiating low back pain; lumbar spine DDD: Degenerative in nature.  Disc desiccation with shallow disc protrusion at L5-S1 seen on 8/9/2022 pelvic MRI.  He is doing well with home exercises; asymptomatic at this  time.  Consider physical therapy in the future if needed.  Chronic illness    3.  Polyuria: Urinating every 1 hour while awake since 2020 and becoming more bothersome.  Consuming 64-96 ounces of water per day.  He would like to see urology.  States that his PCP has left.  Check glucose.  Advised that he see his PCP and if no etiology identified then to see urology.  Urology referral provided per patient request.    4.  Rash: Mild erythema on the extensor surfaces of his elbows and both eyelids.  Dermatitis versus psoriasis. Rash is mild.  Advised seeing dermatology  - Dermatology referral    5.  Vaccinations: Vaccinations reviewed with Mr. Buck.  Risks and benefits of vaccinations were discussed.    - Influenza: Encouraged yearly vaccination  - [id number]: up to date  - [id number]: up to date  - COVID-19: advised updating     Total minutes spent in evaluation with patient, documentation, , and review of pertinent studies and chart notes: 20     Mr. Buck verbalized agreement with and understanding of the rational for the diagnosis and treatment plan.  All questions were answered to best of my ability and the patient's satisfaction. Mr. Buck was advised to contact the clinic with any questions that may arise after the clinic visit.      Thank you for involving me in the care of the patient    Return to clinic: 6 months      HPI   Dayday Buck is a 33 year old male with a past medical history significant for ankylosing spondylitis who is seen for f/u of ankylosing spondylitis.    10/7/2016 rheumatology clinic note by Dr. Tammi Murrieta in [place], [place], documents HLA-B27 positive, sacroiliitis on MRI.  On Humira, naproxen PRN. Ankylosing Spondylitis.      2014 right hip MRI showed sacroiliitis    4/9/2019: Mr. Buck reported that he was doing great on Humira but he lost his job and insurance in December 2018 so has been off Humira since then.  He did well for about 2 months but then on month 2-4 he has  noticed an increased use of naproxen.  He now uses naproxen 220-440 mg twice daily that is helpful for his lower back pain and stiffness.  Morning stiffness for about 1 hour that improves with activity and time and NSAIDs.  He would like to restart Humira.      10/8/2019: no showed to scheduled clinic visit    1/29/2020: doing well when taking humira every 2 weeks, but often late by 1-2 weeks and symptoms return when late.  Mostly back issues when more symptomatic, and morning stiffness for about 1 hour. Says that he will be better about taking Humira with the help from phone reminders and his wife.     7/29/2020: doing better with taking Humira consistently; every 14 days.  Using naproxen 220mg BID PRN, about 2 days per week. More symptomatic for about 1 day prior to his dose of Humira, but very mildly increased achiness he says. Generally the achiness is just related to exercise.  He also notes having seasonal allergies, nasal congestion, and a postnasal drip but no cough.  He wonders what he should do for his allergies.    1/26/2021: dental work (crown) in Nov 2020 with pain since then and using naproxen for the tooth pain; hasn't followed up with his dentist.  AS is doing well without pain or stiffness.  Tolerating naproxen and Humira well. No GERD symptoms. No blood in urine or stool.     7/26/2021: Doing well at this time.  No joint pain or morning stiffness.  70% of the time he is taking Humira every 2 weeks, but occasionally he may take it on week #3 or week #4.  When he delays Humira sometimes he feels more achy and stiff.  As long as he takes Humira every 2 weeks and he has no joint pain or stiffness.  No joint swelling.  Has had some sinus congestion and is taking Claritin and Flonase at the direction of his PCP.    1/24/2022: baby born in August 2021 and when leaning back while holding the baby, bouncing and rocking the baby he feels like that aggravates his back. Also notices that arching his back in a  short shower aggrevates his back.  Gained weight.  Has compressed his job into 7AM-1PM so that he can take care of his child in the morning.  His wife is stepping away from her job and that is a financial burden.  Lifting weights was routine prior to marriage, restarted weight lifting, but finding it hard to stay motivated to exercise. Then minor cold last week.  Humira compliance has been better but not perfect. Using naproxen 220mg 0-1x/day.  Feels like overall his back pain and stiffness is worse in the morning and improves with time and activity.  Would like to try taking Humira every 7 days to see if he would have more benefit, but he does not want to try different medication if this frequency Humira is denied by his insurance and would rather than change back to Humira every 14 days.  His arthritis is not limiting any of his daily activities.    7/25/2022: Inflammatory Tritus is currently doing well.  Tolerating Humira that he says he takes every 14 days without missed doses now.  Morning stiffness for about 5-10 minutes.  Sometimes with low back pain that is worse with activity and improves with rest; pain does not radiate down the legs.  He notes that a friend he has is having serial monitoring with x-rays due to ankylosing spondylitis and he would like to have repeat imaging of his SI joints where he has the intermittent pain.     1/24/2023: low back ache when putting on shoes several months ago, but not now.  Dec 2022 COVID-19 infection so held Humira x2 weeks without worsening symptoms. 90% of the time takes Humira on time; other times may be delayed due to forgetting to order Humira.  Has been exercising more regularly and started running again, with resolution of the low back pain.  No morning stiffness.  No eye pain or redness, but sometimes with a sandpaper feeling in his eyes/feels dry.    7/24/2023: Viral illnesses going throughout his household so he held Humira for a couple months with  subsequently worsening low back pain that is worse in the morning and improves with time and activity.  Morning stiffness for at least 1 hour.  This past weekend he started using naproxen 440 mg twice daily with significant improvement and he will continue this for the next 1-2 weeks.  No black or bloody stools.  No eye pain or redness.  Feels like he is improving since restarting Humira as prescribed and using naproxen.    Today, 1/22/2024: Arthritis well-controlled.  No joint pain.  Minimal morning stiffness of such a low severity and duration that he says he hardly notices it if it is present at all.  Finds that as he ages is a little bit more difficult to bend down to put on his socks but he is still able to do so.  Still able to touch toes from a standing position without difficulty.  Mild dermatitis on the extensor surfaces of his elbows and on both eyelids; has topical steroids from a previous dermatologist for the elbows.  He does not follow with a dermatologist currently.  He also notes polyuria since 2020, where he urinates moderate volume every 1 hour that can be disruptive to his daily activities.  He has not seen urology.  He says that he has discussed this briefly with his primary care provider.  Consuming 64-96 ounces of liquid per day.  Cut out caffeine last summer with no change in urinary frequency.  No pain or burning with urination.    Denies fevers, chills, nausea, vomiting, constipation, diarrhea. No abdominal pain. No chest pain/pressure, palpitations, or shortness of breath. No LE swelling. No neck pain. No oral or nasal sores.  No sicca symptoms. No photosensitivity or photophobia.     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    ROS   12 point review of system was completed and negative except as noted in the HPI     Active Problem List     Patient Active Problem List   Diagnosis    AS  (ankylosing spondylitis) (H)    Acne vulgaris    Vegan diet     Past Medical History     Past Medical History:   Diagnosis Date    Acne     AS (ankylosing spondylitis) (H)     Dyspepsia      Past Surgical History     Past Surgical History:   Procedure Laterality Date    HEAD & NECK SURGERY      wisdom tooth extraction    ORTHOPEDIC SURGERY      3 arthroscopic knee surgery for ACL repair     Allergy     No Known Allergies  Current Medication List     Current Outpatient Medications   Medication Sig    adalimumab (HUMIRA *CF*) 40 MG/0.4ML prefilled syringe kit Inject 0.4 mLs (40 mg) Subcutaneous every 14 days . Hold for signs of infection, then seek medical attention.    azelastine (ASTELIN) 0.1 % nasal spray Spray 2 sprays into both nostrils 2 times daily    fluticasone (FLONASE) 50 MCG/ACT nasal spray Spray 2 sprays into both nostrils 2 times daily (Patient not taking: Reported on 11/2/2021)    naproxen (NAPROSYN) 250 MG tablet Take 1-2 tablets (250-500 mg) by mouth 2 times daily (with meals) as needed for joint pain.  250 mg twice daily for pain 4-7/10. 500 mg twice daily for pain 8-10/10.    naproxen sodium (ANAPROX) 220 MG tablet Take 1 tablet (220 mg) by mouth 2 times daily as needed for moderate pain    oxymetazoline (AFRIN) 0.05 % nasal spray Spray 2 sprays into both nostrils 2 times daily     No current facility-administered medications for this visit.     Social History   See HPI    Family History     Family History   Problem Relation Age of Onset    Family History Negative Mother     Cancer Father         skin cancer basal cell    Family History Negative Brother     Family History Negative Sister     Family History Negative Sister     Breast Cancer Maternal Aunt      No FHx of rheumatologic disease    Physical Exam     Temp Readings from Last 3 Encounters:   11/24/20 97.3  F (36.3  C) (Tympanic)   04/09/19 98.7  F (37.1  C) (Oral)   04/01/19 98.5  F (36.9  C) (Oral)     BP Readings from Last 5 Encounters:  "  01/22/24 129/78   07/24/23 126/79   01/24/23 120/76   07/25/22 131/80   01/24/22 127/79     Pulse Readings from Last 1 Encounters:   01/22/24 62     Resp Readings from Last 1 Encounters:   04/09/19 16     Estimated body mass index is 29.95 kg/m  as calculated from the following:    Height as of 1/24/22: 1.727 m (5' 8\").    Weight as of this encounter: 89.4 kg (197 lb).    GEN: NAD. Healthy appearing adult.   HEENT:  Anicteric, noninjected sclera. No obvious external lesions of the ear and nose. Hearing intact.  CV: S1, S2. RRR. No m/r/g  PULM: No increased work of breathing. CTA bilaterally   MSK: MCPs, PIPs, DIPs without swelling or tenderness to palpation.  Wrists without swelling or tenderness to palpation.  Elbows and shoulders without swelling or tenderness to palpation.  Knees, ankles, and MTPs without swelling or tenderness to palpation.  No difficulty touching toes from a standing position without bending his knees.  SKIN: No rash or jaundice seen  PSYCH: Alert. Appropriate.      Labs / Imaging (select studies)     CBC  Recent Labs   Lab Test 01/18/24  0736 01/20/23  0740 01/24/22  0914 07/26/21  1132 07/24/20  0826 01/29/20  1038 04/09/19  1328   WBC 8.3 6.3 6.9   < > 5.5 6.6 7.2   RBC 5.13 5.05 5.04   < > 4.71 4.65 4.89   HGB 15.2 15.0 15.3   < > 14.8 14.1 15.1   HCT 44.8 43.6 43.6   < > 40.7 40.0 41.9   MCV 87 86 87   < > 86 86 86   RDW 11.5 11.5 11.5   < > 11.9 11.9 11.7    256 258   < > 218 235 258   MCH 29.6 29.7 30.4   < > 31.4 30.3 30.9   MCHC 33.9 34.4 35.1   < > 36.4 35.3 36.0   NEUTROPHIL 45 49 48   < > 40.9 46.0 53.7   LYMPH 38 34 32   < > 34.4 38.1 32.0   MONOCYTE 9 9 14   < > 10.7 10.0 10.0   EOSINOPHIL 7 7 6   < > 13.5 5.3 4.0   BASOPHIL 1 1 0   < > 0.5 0.6 0.3   ANEU  --   --   --   --  2.3 3.0 3.9   ALYM  --   --   --   --  1.9 2.5 2.3   EPI  --   --   --   --  0.6 0.7 0.7   AEOS  --   --   --   --  0.7 0.4 0.3   ABAS  --   --   --   --  0.0 0.0 0.0   ANEUTAUTO 3.7 3.1 3.4   < >  " --   --   --    ALYMPAUTO 3.1 2.1 2.2   < >  --   --   --    AMONOAUTO 0.8 0.6 1.0   < >  --   --   --    AEOSAUTO 0.6 0.5 0.4   < >  --   --   --    ABSBASO 0.1 0.1 0.0   < >  --   --   --     < > = values in this interval not displayed.     CMP  Recent Labs   Lab Test 01/18/24  0736 01/20/23  0740 01/24/22  0914 07/26/21  1132 11/24/20  1417 07/24/20  0826 01/29/20  1038 04/09/19  1328 10/10/17  1359 03/24/17  0953   NA  --   --   --   --   --   --   --   --   --  139   POTASSIUM  --   --   --   --   --   --   --   --   --  4.1   CHLORIDE  --   --   --   --   --   --   --   --   --  104   CO2  --   --   --   --   --   --   --   --   --  29   ANIONGAP  --   --   --   --   --   --   --   --   --  6   GLC  --   --  100*  --  69*  --   --   --   --  80   BUN  --   --   --   --   --   --   --   --   --  13   CR 0.92 0.93 0.93   < >  --  0.93 0.84 1.04   < > 0.82   GFRESTIMATED >90 >90 >90   < >  --  >90 >90 >90   < > >90  Non  GFR Calc     GFRESTBLACK  --   --   --   --   --  >90 >90 >90   < > >90  African American GFR Calc     UZMA  --   --   --   --   --   --   --   --   --  8.7   BILITOTAL 0.5 0.6 0.5   < >  --  0.9 0.4 0.6   < > 0.5   ALBUMIN 4.7 4.7 4.3   < >  --  4.4 4.5 4.6   < > 4.3   PROTTOTAL 7.7 7.7 7.6   < >  --  8.1 7.8 8.1   < > 7.6   ALKPHOS 60 66 67   < >  --  63 54 64   < > 71   AST 31 28 25   < >  --  29 22 23   < > 24   ALT 27 24 38   < >  --  35 24 31   < > 29    < > = values in this interval not displayed.     Calcium/VitaminD  Recent Labs   Lab Test 03/24/17  0953   UZMA 8.7     ESR/CRP  Recent Labs   Lab Test 01/18/24  0736 01/20/23  0740 01/24/22  0914 07/26/21  1132 07/26/21  1132 07/24/20  0826   SED 8 7 4   < > 5 5   CRP  --   --  <2.9  --  <2.9 <2.9   CRPI <3.00 <3.00  --   --   --   --     < > = values in this interval not displayed.     Hepatitis B  Recent Labs   Lab Test 07/26/21  1131   HBCAB Nonreactive   HEPBANG Nonreactive     Hepatitis C  Recent Labs   Lab Test  07/26/21  1131   HCVAB Nonreactive     Tuberculosis Screening  Recent Labs   Lab Test 01/20/23  0740 07/26/21  1133 01/29/20  1038 10/10/17  1359   TBRES Negative Negative Negative  --    TBRSLT  --   --   --  Negative   TBAGN  --   --   --  0.00     Immunization History     Immunization History   Administered Date(s) Administered    COVID-19 MONOVALENT 12+ (Pfizer) 04/26/2021, 05/17/2021    COVID-19 Monovalent 12+ (Pfizer 2022) 01/24/2022    Influenza Vaccine >6 months,quad, PF 10/10/2017, 01/29/2020, 11/24/2020, 01/24/2022, 01/24/2023    Pneumo Conj 13-V (2010&after) 01/21/2016    Pneumococcal 23 valent 10/10/2017    TDAP (Adacel,Boostrix) 04/27/2016          Chart documentation done in part with Dragon Voice recognition Software. Although reviewed after completion, some word and grammatical error may remain.    Bobby Middleton MD

## 2024-01-22 NOTE — NURSING NOTE
RAPID 3    Cumulative Score  3.3       Weighted Score   1.1     Eda WESTBROOK, Specialty RN 1/22/2024 7:24 AM

## 2024-02-20 ENCOUNTER — TELEPHONE (OUTPATIENT)
Dept: RHEUMATOLOGY | Facility: CLINIC | Age: 34
End: 2024-02-20
Payer: COMMERCIAL

## 2024-02-20 NOTE — TELEPHONE ENCOUNTER
PA Initiation    Medication: HUMIRA *CF* 40 MG/0.4ML SC PSKT  Insurance Company: [employer] [place] - Phone [phone] Fax [phone]  Pharmacy Filling the Rx: [facility] - [place], [place] - [address]   Filling Pharmacy Phone: [phone]  Filling Pharmacy Fax: [phone]  Start Date: 2/20/2024  RIA (Key: [id number])  [email or web address]        Thank You,     Nelia Mercedes CPhT  Specialty Pharmacy Clinic [facility] Specialty  [email or web address]  [email or web address]  Phone: [phone]  Fax: [phone]

## 2024-02-21 NOTE — TELEPHONE ENCOUNTER
Prior Authorization Approval    Medication: HUMIRA *CF* 40 MG/0.4ML SC PSKT  Authorization Effective Date: 1/22/2024  Authorization Expiration Date: 2/21/2025  Approved Dose/Quantity: 2 / 28  Reference #: RIA (Key: [id number])   Insurance Company: [employer] [place] - Phone [phone] Fax [phone]  Expected CoPay: $ 5  CoPay Card Available: Other (see comments)    Financial Assistance Needed: [email or web address]   Which Pharmacy is filling the prescription: [facility] - [place], [place] - [address]   Pharmacy Notified: epa renewal  Patient Notified: renewal        Thank You,     Nelia Mercedes [facility]  Specialty Pharmacy Clinic [facility] Specialty  [email or web address]  [email or web address]  Phone: [phone]  Fax: [phone]

## 2024-07-13 ENCOUNTER — HEALTH MAINTENANCE LETTER (OUTPATIENT)
Age: 34
End: 2024-07-13

## 2024-10-17 ENCOUNTER — TELEPHONE (OUTPATIENT)
Dept: RHEUMATOLOGY | Facility: CLINIC | Age: 34
End: 2024-10-17
Payer: COMMERCIAL

## 2024-10-17 NOTE — TELEPHONE ENCOUNTER
[facility] Call Center    Phone Message    May a detailed message be left on voicemail: yes     Reason for Call: Medication Refill Request    Has the patient contacted the pharmacy for the refill? Yes   Name of medication being requested: adalimumab (HUMIRA *CF*) 40 MG/0.4ML prefilled syringe kit   Provider who prescribed the medication: Bobby Middleton MD  Pharmacy:   [facility] - [place] [address] [address]     Date medication is needed: ASAP, per pt, 1 week left before running out.       Action Taken: Other: Rheum    Travel Screening: Not Applicable     Date of Service: 10/17/24

## 2024-10-17 NOTE — TELEPHONE ENCOUNTER
Patient overdue for appointment with Dr. Middleton. Called patient and helped schedule appointment next week so he can be evaluated and have his medication filled.     Remedios VELAZQUEZ RN, Specialty Clinic 10/17/24 11:02 AM

## 2024-10-22 ENCOUNTER — OFFICE VISIT (OUTPATIENT)
Dept: RHEUMATOLOGY | Facility: CLINIC | Age: 34
End: 2024-10-22
Payer: COMMERCIAL

## 2024-10-22 VITALS
DIASTOLIC BLOOD PRESSURE: 87 MMHG | OXYGEN SATURATION: 98 % | BODY MASS INDEX: 30.32 KG/M2 | RESPIRATION RATE: 16 BRPM | HEART RATE: 61 BPM | SYSTOLIC BLOOD PRESSURE: 137 MMHG | WEIGHT: 199.4 LBS

## 2024-10-22 DIAGNOSIS — M45.7 ANKYLOSING SPONDYLITIS OF LUMBOSACRAL REGION (H): Primary | ICD-10-CM

## 2024-10-22 DIAGNOSIS — Z79.1 NSAID LONG-TERM USE: ICD-10-CM

## 2024-10-22 LAB
BASOPHILS # BLD AUTO: 0 10E3/UL (ref 0–0.2)
BASOPHILS NFR BLD AUTO: 0 %
EOSINOPHIL # BLD AUTO: 0.2 10E3/UL (ref 0–0.7)
EOSINOPHIL NFR BLD AUTO: 2 %
ERYTHROCYTE [DISTWIDTH] IN BLOOD BY AUTOMATED COUNT: 11.5 % (ref 10–15)
HCT VFR BLD AUTO: 41.5 % (ref 40–53)
HGB BLD-MCNC: 14.6 G/DL (ref 13.3–17.7)
IMM GRANULOCYTES # BLD: 0 10E3/UL
IMM GRANULOCYTES NFR BLD: 0 %
LYMPHOCYTES # BLD AUTO: 2.5 10E3/UL (ref 0.8–5.3)
LYMPHOCYTES NFR BLD AUTO: 30 %
MCH RBC QN AUTO: 29.7 PG (ref 26.5–33)
MCHC RBC AUTO-ENTMCNC: 35.2 G/DL (ref 31.5–36.5)
MCV RBC AUTO: 84 FL (ref 78–100)
MONOCYTES # BLD AUTO: 0.8 10E3/UL (ref 0–1.3)
MONOCYTES NFR BLD AUTO: 10 %
NEUTROPHILS # BLD AUTO: 4.8 10E3/UL (ref 1.6–8.3)
NEUTROPHILS NFR BLD AUTO: 57 %
PLATELET # BLD AUTO: 277 10E3/UL (ref 150–450)
RBC # BLD AUTO: 4.92 10E6/UL (ref 4.4–5.9)
WBC # BLD AUTO: 8.3 10E3/UL (ref 4–11)

## 2024-10-22 PROCEDURE — 86481 TB AG RESPONSE T-CELL SUSP: CPT | Performed by: INTERNAL MEDICINE

## 2024-10-22 PROCEDURE — 82565 ASSAY OF CREATININE: CPT | Performed by: INTERNAL MEDICINE

## 2024-10-22 PROCEDURE — 99214 OFFICE O/P EST MOD 30 MIN: CPT | Performed by: INTERNAL MEDICINE

## 2024-10-22 PROCEDURE — 80076 HEPATIC FUNCTION PANEL: CPT | Performed by: INTERNAL MEDICINE

## 2024-10-22 PROCEDURE — 36415 COLL VENOUS BLD VENIPUNCTURE: CPT | Performed by: INTERNAL MEDICINE

## 2024-10-22 PROCEDURE — G2211 COMPLEX E/M VISIT ADD ON: HCPCS | Performed by: INTERNAL MEDICINE

## 2024-10-22 PROCEDURE — 85025 COMPLETE CBC W/AUTO DIFF WBC: CPT | Performed by: INTERNAL MEDICINE

## 2024-10-22 RX ORDER — NAPROXEN 250 MG/1
250-500 TABLET ORAL 2 TIMES DAILY WITH MEALS
Qty: 360 TABLET | Refills: 1 | Status: SHIPPED | OUTPATIENT
Start: 2024-10-22

## 2024-10-22 NOTE — PATIENT INSTRUCTIONS
RHEUMATOLOGY    [facility]  [address]  [place] [address]    Phone number: [phone]  Fax number: [phone]    If you need a medication refill, please contact us as you may need lab work and/or a follow up visit prior to your refill.      Thank you for choosing [facility]!    Kassidy aDve CMA Rheumatology      Nleia Mercedes  Pharmacy Liaison  Phone number:  [phone]

## 2024-10-22 NOTE — NURSING NOTE
RAPID3 (0-30) Cumulative Score  6.7          RAPID3 Weighted Score (divide #4 by 3 and that is the weighted score)  2.2

## 2024-10-22 NOTE — PROGRESS NOTES
Rheumatology Clinic Visit      Dayday Buck MRN# [id number]   YOB: 1990 Age: 34 year old      Date of visit: 10/22/24   PCP: [facility]    Chief Complaint   Patient presents with:  Ankylosing Spondylitis: Doesn'tknow if the work he is doing in the house is making him feel worse or if he is getting worse    Assessment and Plan     1. Ankylosing Spondylitis: Diagnosed in  by Dr. Tammi Murrieta, rheumatologist in [place], [place].  2014 [facility] MRI of the right hip showed sacroiliitis.  He has been on Humira since  with resolution of his initial symptoms that included pain and stiffness of his bilateral hips, lower back, and neck.  He then had issues with Humira compliance, usually because he was not reordering his medication but since he has had his wife help with reordering he has been much better taking medication as prescribed.  2022 MRI of the pelvis showed degenerative changes at L5-S1 but no active sacroiliitis.  Does well as long as he takes Humira as prescribed.  Using naproxen for degenerative low back symptoms.  Currently doing well.  10/22/2024 modified Schober's with 5 cm expansion.  I do not recommend serial monitoring with MRIs.   Chronic illness, stable  - Continue Humira 40mg SQ every 14 days    - Naproxen 250-500 mg BID PRN  - Labs today: CBC, Creatinine, Hepatic Panel, quantiferon TB gold plus    2. Intermittent chronic nonradiating low back pain; lumbar spine DDD: Degenerative in nature.  Disc desiccation with shallow disc protrusion at L5-S1 seen on 2022 pelvic MRI.  Continue home exercises.  Consider physical therapy in the future if needed.  More recently with increased degenerative low back pain since carrying around a  and doing home remodeling projects with putting in new baseboards and walls.  Chronic illness    3.  Vaccinations: Vaccinations reviewed with Mr. Buck.  Risks and benefits of vaccinations were discussed.    -  Influenza: Encouraged yearly vaccination  - [id number]: up to date  - [id number]: up to date  - COVID-19: advised updating   - Shingrix: Advised vaccination and he will consider checking with his insurance about coverage    Total minutes spent in evaluation with patient, documentation, , and review of pertinent studies and chart notes: 22  The longitudinal plan of care for the rheumatology problem(s) were addressed during this visit.  Due to added complexity of care, we will continue to support the patient and the subsequent management of this condition with ongoing continuity of care.      Mr. Buck verbalized agreement with and understanding of the rational for the diagnosis and treatment plan.  All questions were answered to best of my ability and the patient's satisfaction. Mr. Buck was advised to contact the clinic with any questions that may arise after the clinic visit.      Thank you for involving me in the care of the patient    Return to clinic: 6 months      HPI   Dayday Buck is a 34 year old male with a past medical history significant for ankylosing spondylitis who is seen for f/u of ankylosing spondylitis.    10/7/2016 rheumatology clinic note by Dr. Tammi Murrieta in [place], [place], documents HLA-B27 positive, sacroiliitis on MRI.  On Humira, naproxen PRN. Ankylosing Spondylitis.      2014 right hip MRI showed sacroiliitis    4/9/2019: Mr. Buck reported that he was doing great on Humira but he lost his job and insurance in December 2018 so has been off Humira since then.  He did well for about 2 months but then on month 2-4 he has noticed an increased use of naproxen.  He now uses naproxen 220-440 mg twice daily that is helpful for his lower back pain and stiffness.  Morning stiffness for about 1 hour that improves with activity and time and NSAIDs.  He would like to restart Humira.      10/8/2019: no showed to scheduled clinic visit    1/29/2020: doing well when taking humira every 2 weeks,  but often late by 1-2 weeks and symptoms return when late.  Mostly back issues when more symptomatic, and morning stiffness for about 1 hour. Says that he will be better about taking Humira with the help from phone reminders and his wife.     7/29/2020: doing better with taking Humira consistently; every 14 days.  Using naproxen 220mg BID PRN, about 2 days per week. More symptomatic for about 1 day prior to his dose of Humira, but very mildly increased achiness he says. Generally the achiness is just related to exercise.  He also notes having seasonal allergies, nasal congestion, and a postnasal drip but no cough.  He wonders what he should do for his allergies.    1/26/2021: dental work (crown) in Nov 2020 with pain since then and using naproxen for the tooth pain; hasn't followed up with his dentist.  AS is doing well without pain or stiffness.  Tolerating naproxen and Humira well. No GERD symptoms. No blood in urine or stool.     7/26/2021: Doing well at this time.  No joint pain or morning stiffness.  70% of the time he is taking Humira every 2 weeks, but occasionally he may take it on week #3 or week #4.  When he delays Humira sometimes he feels more achy and stiff.  As long as he takes Humira every 2 weeks and he has no joint pain or stiffness.  No joint swelling.  Has had some sinus congestion and is taking Claritin and Flonase at the direction of his PCP.    1/24/2022: baby born in August 2021 and when leaning back while holding the baby, bouncing and rocking the baby he feels like that aggravates his back. Also notices that arching his back in a short shower aggrevates his back.  Gained weight.  Has compressed his job into 7AM-1PM so that he can take care of his child in the morning.  His wife is stepping away from her job and that is a financial burden.  Lifting weights was routine prior to marriage, restarted weight lifting, but finding it hard to stay motivated to exercise. Then minor cold last week.   Humira compliance has been better but not perfect. Using naproxen 220mg 0-1x/day.  Feels like overall his back pain and stiffness is worse in the morning and improves with time and activity.  Would like to try taking Humira every 7 days to see if he would have more benefit, but he does not want to try different medication if this frequency Humira is denied by his insurance and would rather than change back to Humira every 14 days.  His arthritis is not limiting any of his daily activities.    7/25/2022: Inflammatory Tritus is currently doing well.  Tolerating Humira that he says he takes every 14 days without missed doses now.  Morning stiffness for about 5-10 minutes.  Sometimes with low back pain that is worse with activity and improves with rest; pain does not radiate down the legs.  He notes that a friend he has is having serial monitoring with x-rays due to ankylosing spondylitis and he would like to have repeat imaging of his SI joints where he has the intermittent pain.     1/24/2023: low back ache when putting on shoes several months ago, but not now.  Dec 2022 COVID-19 infection so held Humira x2 weeks without worsening symptoms. 90% of the time takes Humira on time; other times may be delayed due to forgetting to order Humira.  Has been exercising more regularly and started running again, with resolution of the low back pain.  No morning stiffness.  No eye pain or redness, but sometimes with a sandpaper feeling in his eyes/feels dry.    7/24/2023: Viral illnesses going throughout his household so he held Humira for a couple months with subsequently worsening low back pain that is worse in the morning and improves with time and activity.  Morning stiffness for at least 1 hour.  This past weekend he started using naproxen 440 mg twice daily with significant improvement and he will continue this for the next 1-2 weeks.  No black or bloody stools.  No eye pain or redness.  Feels like he is improving since  restarting Humira as prescribed and using naproxen.    2024: Arthritis well-controlled.  No joint pain.  Minimal morning stiffness of such a low severity and duration that he says he hardly notices it if it is present at all.  Finds that as he ages is a little bit more difficult to bend down to put on his socks but he is still able to do so.  Still able to touch toes from a standing position without difficulty.  Mild dermatitis on the extensor surfaces of his elbows and on both eyelids; has topical steroids from a previous dermatologist for the elbows.  He does not follow with a dermatologist currently.  He also notes polyuria since , where he urinates moderate volume every 1 hour that can be disruptive to his daily activities.  He has not seen urology.  He says that he has discussed this briefly with his primary care provider.  Consuming 64-96 ounces of liquid per day.  Cut out caffeine last summer with no change in urinary frequency.  No pain or burning with urination.    Today, 10/22/2024: Vasectomy last week.  Degenerative low back pain worse with increased activity, putting in new baseboards and walls in his house in preparation for a , then worse with carrying around a .  Otherwise has been doing well.  Using naproxen as needed.    Denies fevers, chills, nausea, vomiting, constipation, diarrhea. No abdominal pain. No chest pain/pressure, palpitations, or shortness of breath. No LE swelling. No neck pain. No oral or nasal sores.  No sicca symptoms. No photosensitivity or photophobia.     Tobacco: none  EtOH: 1 drink per week  Drugs: none  Occupation: Was a  at the 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    ROS   12 point review of system was completed and negative except as noted in the HPI     Active Problem List     Patient Active Problem List   Diagnosis    AS (ankylosing spondylitis) (H)    Acne vulgaris    Vegan diet     Past Medical History     Past Medical  History:   Diagnosis Date    Acne     AS (ankylosing spondylitis) (H)     Dyspepsia      Past Surgical History     Past Surgical History:   Procedure Laterality Date    HEAD & NECK SURGERY      wisdom tooth extraction    ORTHOPEDIC SURGERY      3 arthroscopic knee surgery for ACL repair     Allergy     No Known Allergies  Current Medication List     Current Outpatient Medications   Medication Sig Dispense Refill    adalimumab (HUMIRA *CF*) 40 MG/0.4ML prefilled syringe kit Inject 0.4 mLs (40 mg) Subcutaneous every 14 days . Hold for signs of infection, then seek medical attention. 2.4 mL 2    azelastine (ASTELIN) 0.1 % nasal spray Spray 2 sprays into both nostrils 2 times daily 30 mL 1    naproxen (NAPROSYN) 250 MG tablet Take 1-2 tablets (250-500 mg) by mouth 2 times daily (with meals) as needed for joint pain.  250 mg twice daily for pain 4-7/10. 500 mg twice daily for pain 8-10/10. 360 tablet 1    fluticasone (FLONASE) 50 MCG/ACT nasal spray Spray 2 sprays into both nostrils 2 times daily (Patient not taking: Reported on 11/2/2021) 16 g 2    naproxen sodium (ANAPROX) 220 MG tablet Take 1 tablet (220 mg) by mouth 2 times daily as needed for moderate pain (Patient not taking: Reported on 10/22/2024)      oxymetazoline (AFRIN) 0.05 % nasal spray Spray 2 sprays into both nostrils 2 times daily       No current facility-administered medications for this visit.     Social History   See HPI    Family History     Family History   Problem Relation Age of Onset    Family History Negative Mother     Cancer Father         skin cancer basal cell    Family History Negative Brother     Family History Negative Sister     Family History Negative Sister     Breast Cancer Maternal Aunt      No FHx of rheumatologic disease    Physical Exam     Temp Readings from Last 3 Encounters:   11/24/20 97.3  F (36.3  C) (Tympanic)   04/09/19 98.7  F (37.1  C) (Oral)   04/01/19 98.5  F (36.9  C) (Oral)     BP Readings from Last 5 Encounters:  "  10/22/24 137/87   01/22/24 129/78   07/24/23 126/79   01/24/23 120/76   07/25/22 131/80     Pulse Readings from Last 1 Encounters:   10/22/24 61     Resp Readings from Last 1 Encounters:   10/22/24 16     Estimated body mass index is 30.32 kg/m  as calculated from the following:    Height as of 1/24/22: 1.727 m (5' 8\").    Weight as of this encounter: 90.4 kg (199 lb 6.4 oz).    GEN: NAD. Healthy appearing adult.   HEENT:  Anicteric, noninjected sclera. No obvious external lesions of the ear and nose. Hearing intact.  CV: S1, S2. RRR. No m/r/g  PULM: No increased work of breathing. CTA bilaterally   MSK: MCPs, PIPs, DIPs without swelling or tenderness to palpation.  Wrists without swelling or tenderness to palpation.  Elbows and shoulders without swelling or tenderness to palpation.  Knees, ankles, and MTPs without swelling or tenderness to palpation.  No difficulty touching toes from a standing position without bending his knees.  Modified Schober's with 5 cm expansion  SKIN: No rash or jaundice seen  PSYCH: Alert. Appropriate.      Labs / Imaging (select studies)     CBC  Recent Labs   Lab Test 01/18/24  0736 01/20/23  0740 01/24/22  0914 07/26/21  1132 07/24/20  0826 01/29/20  1038 04/09/19  1328   WBC 8.3 6.3 6.9   < > 5.5 6.6 7.2   RBC 5.13 5.05 5.04   < > 4.71 4.65 4.89   HGB 15.2 15.0 15.3   < > 14.8 14.1 15.1   HCT 44.8 43.6 43.6   < > 40.7 40.0 41.9   MCV 87 86 87   < > 86 86 86   RDW 11.5 11.5 11.5   < > 11.9 11.9 11.7    256 258   < > 218 235 258   MCH 29.6 29.7 30.4   < > 31.4 30.3 30.9   MCHC 33.9 34.4 35.1   < > 36.4 35.3 36.0   NEUTROPHIL 45 49 48   < > 40.9 46.0 53.7   LYMPH 38 34 32   < > 34.4 38.1 32.0   MONOCYTE 9 9 14   < > 10.7 10.0 10.0   EOSINOPHIL 7 7 6   < > 13.5 5.3 4.0   BASOPHIL 1 1 0   < > 0.5 0.6 0.3   ANEU  --   --   --   --  2.3 3.0 3.9   ALYM  --   --   --   --  1.9 2.5 2.3   EPI  --   --   --   --  0.6 0.7 0.7   AEOS  --   --   --   --  0.7 0.4 0.3   ABAS  --   --   --   " --  0.0 0.0 0.0   ANEUTAUTO 3.7 3.1 3.4   < >  --   --   --    ALYMPAUTO 3.1 2.1 2.2   < >  --   --   --    AMONOAUTO 0.8 0.6 1.0   < >  --   --   --    AEOSAUTO 0.6 0.5 0.4   < >  --   --   --    ABSBASO 0.1 0.1 0.0   < >  --   --   --     < > = values in this interval not displayed.     CMP  Recent Labs   Lab Test 01/18/24  0736 01/20/23  0740 01/24/22  0914 07/26/21  1132 11/24/20  1417 07/24/20  0826 01/29/20  1038 04/09/19  1328 10/10/17  1359 03/24/17  0953   NA  --   --   --   --   --   --   --   --   --  139   POTASSIUM  --   --   --   --   --   --   --   --   --  4.1   CHLORIDE  --   --   --   --   --   --   --   --   --  104   CO2  --   --   --   --   --   --   --   --   --  29   ANIONGAP  --   --   --   --   --   --   --   --   --  6   GLC  --   --  100*  --  69*  --   --   --   --  80   BUN  --   --   --   --   --   --   --   --   --  13   CR 0.92 0.93 0.93   < >  --  0.93 0.84 1.04   < > 0.82   GFRESTIMATED >90 >90 >90   < >  --  >90 >90 >90   < > >90  Non  GFR Calc     GFRESTBLACK  --   --   --   --   --  >90 >90 >90   < > >90  African American GFR Calc     UZMA  --   --   --   --   --   --   --   --   --  8.7   BILITOTAL 0.5 0.6 0.5   < >  --  0.9 0.4 0.6   < > 0.5   ALBUMIN 4.7 4.7 4.3   < >  --  4.4 4.5 4.6   < > 4.3   PROTTOTAL 7.7 7.7 7.6   < >  --  8.1 7.8 8.1   < > 7.6   ALKPHOS 60 66 67   < >  --  63 54 64   < > 71   AST 31 28 25   < >  --  29 22 23   < > 24   ALT 27 24 38   < >  --  35 24 31   < > 29    < > = values in this interval not displayed.     Calcium/VitaminD  Recent Labs   Lab Test 03/24/17  0953   UZMA 8.7     ESR/CRP  Recent Labs   Lab Test 01/18/24  0736 01/20/23  0740 01/24/22  0914 07/26/21  1132 07/26/21  1132 07/24/20  0826   SED 8 7 4   < > 5 5   CRP  --   --  <2.9  --  <2.9 <2.9   CRPI <3.00 <3.00  --   --   --   --     < > = values in this interval not displayed.     Lipid Panel  Recent Labs   Lab Test 11/24/20  1417 03/08/17  1108   CHOL 206* 163   TRIG 136  108   HDL 37* 38*   * 103*   NHDL 169* 125     Hepatitis B  Recent Labs   Lab Test 07/26/21  1131   HBCAB Nonreactive   HEPBANG Nonreactive     Hepatitis C  Recent Labs   Lab Test 07/26/21  1131   HCVAB Nonreactive     Tuberculosis Screening  Recent Labs   Lab Test 01/20/23  0740 07/26/21  1133 01/29/20  1038 10/10/17  1359   TBRES Negative Negative Negative  --    TBRSLT  --   --   --  Negative   TBAGN  --   --   --  0.00       Immunization History     Immunization History   Administered Date(s) Administered    COVID-19 MONOVALENT 12+ (Pfizer) 04/26/2021, 05/17/2021    COVID-19 Monovalent 12+ (Pfizer 2022) 01/24/2022    Influenza Vaccine >6 months,quad, PF 10/10/2017, 01/29/2020, 11/24/2020, 01/24/2022, 01/24/2023    Pneumo Conj 13-V (2010&after) 01/21/2016    Pneumococcal 23 valent 10/10/2017    TDAP (Adacel,Boostrix) 04/27/2016          Chart documentation done in part with Dragon Voice recognition Software. Although reviewed after completion, some word and grammatical error may remain.    Bobby Middleton MD

## 2024-10-23 DIAGNOSIS — R79.89 ELEVATED LFTS: Primary | ICD-10-CM

## 2024-10-23 LAB
ALBUMIN SERPL BCG-MCNC: 4.6 G/DL (ref 3.5–5.2)
ALP SERPL-CCNC: 72 U/L (ref 40–150)
ALT SERPL W P-5'-P-CCNC: 168 U/L (ref 0–70)
AST SERPL W P-5'-P-CCNC: 115 U/L (ref 0–45)
BILIRUB DIRECT SERPL-MCNC: <0.2 MG/DL (ref 0–0.3)
BILIRUB SERPL-MCNC: 0.5 MG/DL
CREAT SERPL-MCNC: 0.87 MG/DL (ref 0.67–1.17)
EGFRCR SERPLBLD CKD-EPI 2021: >90 ML/MIN/1.73M2
PROT SERPL-MCNC: 7.9 G/DL (ref 6.4–8.3)

## 2024-10-23 NOTE — RESULT ENCOUNTER NOTE
RN: Please call to notify Dayday Buck that liver enzymes AST and ALT are both significantly elevated.  This can be affected by NSAID use, Tylenol use, alcohol, etc. Please refrain from NSAID, Tylenol, and alcohol use for now, and have a repeat blood draw in 2 weeks to reassess, and check hepatitis B and C labs.  Please assist him with scheduling a lab appointment to be in 2 weeks.    Bobby Middleton MD  10/23/2024

## 2024-10-24 LAB
GAMMA INTERFERON BACKGROUND BLD IA-ACNC: 0 IU/ML
M TB IFN-G BLD-IMP: NEGATIVE
M TB IFN-G CD4+ BCKGRND COR BLD-ACNC: 10 IU/ML
MITOGEN IGNF BCKGRD COR BLD-ACNC: 0.01 IU/ML
MITOGEN IGNF BCKGRD COR BLD-ACNC: 0.01 IU/ML
QUANTIFERON MITOGEN: 10 IU/ML
QUANTIFERON NIL TUBE: 0 IU/ML
QUANTIFERON TB1 TUBE: 0.01 IU/ML
QUANTIFERON TB2 TUBE: 0.01

## 2024-11-08 ENCOUNTER — TELEPHONE (OUTPATIENT)
Dept: RHEUMATOLOGY | Facility: CLINIC | Age: 34
End: 2024-11-08

## 2024-11-08 ENCOUNTER — LAB (OUTPATIENT)
Dept: LAB | Facility: CLINIC | Age: 34
End: 2024-11-08
Payer: COMMERCIAL

## 2024-11-08 DIAGNOSIS — R79.89 ELEVATED LFTS: ICD-10-CM

## 2024-11-08 LAB
ALT SERPL W P-5'-P-CCNC: 28 U/L (ref 0–70)
AST SERPL W P-5'-P-CCNC: 26 U/L (ref 0–45)
HBV CORE AB SERPL QL IA: NONREACTIVE
HBV SURFACE AG SERPL QL IA: NONREACTIVE
HCV AB SERPL QL IA: NONREACTIVE

## 2024-11-08 PROCEDURE — 86704 HEP B CORE ANTIBODY TOTAL: CPT

## 2024-11-08 PROCEDURE — 87340 HEPATITIS B SURFACE AG IA: CPT

## 2024-11-08 PROCEDURE — 84450 TRANSFERASE (AST) (SGOT): CPT

## 2024-11-08 PROCEDURE — 84460 ALANINE AMINO (ALT) (SGPT): CPT

## 2024-11-08 PROCEDURE — 36415 COLL VENOUS BLD VENIPUNCTURE: CPT

## 2024-11-08 PROCEDURE — 86803 HEPATITIS C AB TEST: CPT

## 2024-11-08 NOTE — TELEPHONE ENCOUNTER
Patient called to discuss Humira copay card and ongoing assistance.     He would like to utilize Humira rebate option for 2025.    COPAY CARD OBTAINED    Medication: HUMIRA (2 PEN) 40 MG/0.4ML SC AJKT  Sponsor: [employer] Copay Card  Member ID: [id number]  BIN: [id number]  PCN: [id number]  Group: [id number]  Expected Copay: $    Copay card Monthly Max Amount: $    Copay card Annual Amount: $ 14,000       *Sent patient post-transaction rebate option instructions via [employer].:    [email or web address]    Thank You,     Yovany Mercedes CPhT  Specialty Pharmacy Clinic [facility] Special[facility]  [email or web address]  [email or web address]  Phone: [phone]  Fax: [phone]

## 2025-01-14 ENCOUNTER — TELEPHONE (OUTPATIENT)
Dept: RHEUMATOLOGY | Facility: CLINIC | Age: 35
End: 2025-01-14
Payer: COMMERCIAL

## 2025-01-14 NOTE — TELEPHONE ENCOUNTER
PA Initiation    Medication: HUMIRA *CF* 40 MG/0.4ML SC PSKT  Insurance Company: [employer] [place] - Phone [phone] Fax [phone]  Pharmacy Filling the Rx: [facility] - [place], [place] - [address]   Filling Pharmacy Phone: [phone]  Filling Pharmacy Fax: [phone]  Start Date: 1/14/2025  RIA (Key: [id number])    PA renewal generated by CMM    The preferred biosimilar agents are: Adalimumab-aaty, Adalimumab-adaz, Hadlima, Simlandi.        Thank You,     Nelia Mercedes CPhT  Specialty Pharmacy Clinic [facility] Specialty  [email or web address]  [email or web address]  Phone: [phone]  Fax: [phone]

## 2025-01-16 NOTE — TELEPHONE ENCOUNTER
Prior Authorization Approval    Medication: HUMIRA *CF* 40 MG/0.4ML SC PSKT  Authorization Effective Date: 12/16/2024  Authorization Expiration Date: 1/15/2026  Approved Dose/Quantity: 2 syringe per 28 day supply  Reference #: RIA (Key: [id number])   Insurance Company: [employer] [place] - Phone [phone] Fax [phone]  Expected CoPay: $    CoPay Card Available: Other (see comments)    Financial Assistance Needed: [email or web address]  Which Pharmacy is filling the prescription: [facility] - [place], [place] - [address]   Pharmacy Notified: renewal  Patient Notified: renewal          Thank You,     Yovany Mercedes [facility]  Specialty Pharmacy Clinic [facility] Special[facility]  [email or web address]  [email or web address]  Phone: [phone]  Fax: [phone]

## 2025-04-22 ENCOUNTER — OFFICE VISIT (OUTPATIENT)
Dept: RHEUMATOLOGY | Facility: CLINIC | Age: 35
End: 2025-04-22
Payer: COMMERCIAL

## 2025-04-22 VITALS
OXYGEN SATURATION: 98 % | HEART RATE: 59 BPM | BODY MASS INDEX: 31.08 KG/M2 | RESPIRATION RATE: 16 BRPM | DIASTOLIC BLOOD PRESSURE: 88 MMHG | SYSTOLIC BLOOD PRESSURE: 128 MMHG | WEIGHT: 204.4 LBS

## 2025-04-22 DIAGNOSIS — Z79.1 NSAID LONG-TERM USE: ICD-10-CM

## 2025-04-22 DIAGNOSIS — M45.7 ANKYLOSING SPONDYLITIS OF LUMBOSACRAL REGION (H): Primary | ICD-10-CM

## 2025-04-22 LAB
BASOPHILS # BLD AUTO: 0 10E3/UL (ref 0–0.2)
BASOPHILS NFR BLD AUTO: 1 %
EOSINOPHIL # BLD AUTO: 0.4 10E3/UL (ref 0–0.7)
EOSINOPHIL NFR BLD AUTO: 6 %
ERYTHROCYTE [DISTWIDTH] IN BLOOD BY AUTOMATED COUNT: 11.5 % (ref 10–15)
ERYTHROCYTE [SEDIMENTATION RATE] IN BLOOD BY WESTERGREN METHOD: 7 MM/HR (ref 0–15)
HCT VFR BLD AUTO: 43.1 % (ref 40–53)
HGB BLD-MCNC: 14.6 G/DL (ref 13.3–17.7)
IMM GRANULOCYTES # BLD: 0 10E3/UL
IMM GRANULOCYTES NFR BLD: 0 %
LYMPHOCYTES # BLD AUTO: 2.4 10E3/UL (ref 0.8–5.3)
LYMPHOCYTES NFR BLD AUTO: 38 %
MCH RBC QN AUTO: 29 PG (ref 26.5–33)
MCHC RBC AUTO-ENTMCNC: 33.9 G/DL (ref 31.5–36.5)
MCV RBC AUTO: 86 FL (ref 78–100)
MONOCYTES # BLD AUTO: 0.7 10E3/UL (ref 0–1.3)
MONOCYTES NFR BLD AUTO: 10 %
NEUTROPHILS # BLD AUTO: 2.9 10E3/UL (ref 1.6–8.3)
NEUTROPHILS NFR BLD AUTO: 45 %
PLATELET # BLD AUTO: 253 10E3/UL (ref 150–450)
RBC # BLD AUTO: 5.04 10E6/UL (ref 4.4–5.9)
WBC # BLD AUTO: 6.4 10E3/UL (ref 4–11)

## 2025-04-22 PROCEDURE — 3079F DIAST BP 80-89 MM HG: CPT | Performed by: INTERNAL MEDICINE

## 2025-04-22 PROCEDURE — 86140 C-REACTIVE PROTEIN: CPT | Performed by: INTERNAL MEDICINE

## 2025-04-22 PROCEDURE — G2211 COMPLEX E/M VISIT ADD ON: HCPCS | Performed by: INTERNAL MEDICINE

## 2025-04-22 PROCEDURE — 99214 OFFICE O/P EST MOD 30 MIN: CPT | Performed by: INTERNAL MEDICINE

## 2025-04-22 PROCEDURE — 36415 COLL VENOUS BLD VENIPUNCTURE: CPT | Performed by: INTERNAL MEDICINE

## 2025-04-22 PROCEDURE — 85025 COMPLETE CBC W/AUTO DIFF WBC: CPT | Performed by: INTERNAL MEDICINE

## 2025-04-22 PROCEDURE — 80076 HEPATIC FUNCTION PANEL: CPT | Performed by: INTERNAL MEDICINE

## 2025-04-22 PROCEDURE — 82565 ASSAY OF CREATININE: CPT | Performed by: INTERNAL MEDICINE

## 2025-04-22 PROCEDURE — 85652 RBC SED RATE AUTOMATED: CPT | Performed by: INTERNAL MEDICINE

## 2025-04-22 PROCEDURE — 3074F SYST BP LT 130 MM HG: CPT | Performed by: INTERNAL MEDICINE

## 2025-04-22 RX ORDER — NAPROXEN 250 MG/1
250-500 TABLET ORAL 2 TIMES DAILY WITH MEALS
Qty: 360 TABLET | Refills: 1 | Status: SHIPPED | OUTPATIENT
Start: 2025-04-22

## 2025-04-22 NOTE — PROGRESS NOTES
Rheumatology Clinic Visit      [place] MRN# [id number]   YOB: 1990 Age: 34 year old      Date of visit: 25   PCP: [facility]    Chief Complaint   Patient presents with:  Ankylosing spondylitis: Has had more stiffness, if he has more pain it sticks with him for a couple days.    Assessment and Plan     1. Ankylosing Spondylitis: Diagnosed in  by Dr. Tammi Murrieta, rheumatologist in [place], [place].  2014 [facility] MRI of the right hip showed sacroiliitis.  He has been on Humira since  with resolution of his initial symptoms that included pain and stiffness of his bilateral hips, lower back, and neck.  He then had issues with Humira compliance, usually because he was not reordering his medication but since he has had his wife help with reordering he has been much better taking medication as prescribed.  2022 MRI of the pelvis showed degenerative changes at L5-S1 but no active sacroiliitis.  Does well as long as he takes Humira as prescribed.  Using naproxen for degenerative low back symptoms.  Currently doing well.  10/22/2024 modified Schober's with 5 cm expansion.  2025 modified Schober's with 5.5 cm expansion.  I do not recommend serial monitoring with MRIs.   Chronic illness, stable  - Continue Humira 40mg SQ every 14 days    - Naproxen 250-500 mg BID PRN, 250 mg twice daily for pain 4-7/10. 500 mg twice daily for pain 8-10/10  - Labs today: CBC, Creatinine, Hepatic Panel, ESR, CRP  - Labs in 6 months: CBC, Creatinine, Hepatic Panel, ESR, CRP, quantiferon TB gold plus    2. Intermittent chronic nonradiating low back pain; lumbar spine DDD: Degenerative in nature.  Disc desiccation with shallow disc protrusion at L5-S1 seen on 2022 pelvic MRI.  Continue home exercises.  Consider physical therapy in the future if needed.  More recently with increased degenerative low back pain since carrying around a  and doing home remodeling  projects with putting in new baseboards and walls.  Chronic illness    3.  Vaccinations: Vaccinations reviewed with Mr. Buck.  Risks and benefits of vaccinations were discussed.    - Influenza: Encouraged yearly vaccination  - [id number]: up to date  - [id number]: up to date  - [id number]: advise vaccination   - COVID-19: advise updating   - Shingrix: Advised vaccination and he will consider checking with his insurance about coverage    Total minutes spent in evaluation with patient, documentation, , and review of pertinent studies and chart notes: 18  The longitudinal plan of care for the rheumatology problem(s) were addressed during this visit.  Due to added complexity of care, we will continue to support the patient and the subsequent management of this condition with ongoing continuity of care.      Mr. Buck verbalized agreement with and understanding of the rational for the diagnosis and treatment plan.  All questions were answered to best of my ability and the patient's satisfaction. Mr. Buck was advised to contact the clinic with any questions that may arise after the clinic visit.      Thank you for involving me in the care of the patient    Return to clinic: 6 months      HPI   [place] is a 34 year old male with a past medical history significant for ankylosing spondylitis who is seen for f/u of ankylosing spondylitis.    10/7/2016 rheumatology clinic note by Dr. Tammi Murrieta in [place], [place], documents HLA-B27 positive, sacroiliitis on MRI.  On Humira, naproxen PRN. Ankylosing Spondylitis.      2014 right hip MRI showed sacroiliitis    4/9/2019: Mr. Buck reported that he was doing great on Humira but he lost his job and insurance in December 2018 so has been off Humira since then.  He did well for about 2 months but then on month 2-4 he has noticed an increased use of naproxen.  He now uses naproxen 220-440 mg twice daily that is helpful for his lower back pain and stiffness.  Morning  stiffness for about 1 hour that improves with activity and time and NSAIDs.  He would like to restart Humira.      10/8/2019: no showed to scheduled clinic visit    1/29/2020: doing well when taking humira every 2 weeks, but often late by 1-2 weeks and symptoms return when late.  Mostly back issues when more symptomatic, and morning stiffness for about 1 hour. Says that he will be better about taking Humira with the help from phone reminders and his wife.     7/29/2020: doing better with taking Humira consistently; every 14 days.  Using naproxen 220mg BID PRN, about 2 days per week. More symptomatic for about 1 day prior to his dose of Humira, but very mildly increased achiness he says. Generally the achiness is just related to exercise.  He also notes having seasonal allergies, nasal congestion, and a postnasal drip but no cough.  He wonders what he should do for his allergies.    1/26/2021: dental work (crown) in Nov 2020 with pain since then and using naproxen for the tooth pain; hasn't followed up with his dentist.  AS is doing well without pain or stiffness.  Tolerating naproxen and Humira well. No GERD symptoms. No blood in urine or stool.     7/26/2021: Doing well at this time.  No joint pain or morning stiffness.  70% of the time he is taking Humira every 2 weeks, but occasionally he may take it on week #3 or week #4.  When he delays Humira sometimes he feels more achy and stiff.  As long as he takes Humira every 2 weeks and he has no joint pain or stiffness.  No joint swelling.  Has had some sinus congestion and is taking Claritin and Flonase at the direction of his PCP.    1/24/2022: baby born in August 2021 and when leaning back while holding the baby, bouncing and rocking the baby he feels like that aggravates his back. Also notices that arching his back in a short shower aggrevates his back.  Gained weight.  Has compressed his job into 7AM-1PM so that he can take care of his child in the morning.  His  wife is stepping away from her job and that is a financial burden.  Lifting weights was routine prior to marriage, restarted weight lifting, but finding it hard to stay motivated to exercise. Then minor cold last week.  Humira compliance has been better but not perfect. Using naproxen 220mg 0-1x/day.  Feels like overall his back pain and stiffness is worse in the morning and improves with time and activity.  Would like to try taking Humira every 7 days to see if he would have more benefit, but he does not want to try different medication if this frequency Humira is denied by his insurance and would rather than change back to Humira every 14 days.  His arthritis is not limiting any of his daily activities.    7/25/2022: Inflammatory Tritus is currently doing well.  Tolerating Humira that he says he takes every 14 days without missed doses now.  Morning stiffness for about 5-10 minutes.  Sometimes with low back pain that is worse with activity and improves with rest; pain does not radiate down the legs.  He notes that a friend he has is having serial monitoring with x-rays due to ankylosing spondylitis and he would like to have repeat imaging of his SI joints where he has the intermittent pain.     1/24/2023: low back ache when putting on shoes several months ago, but not now.  Dec 2022 COVID-19 infection so held Humira x2 weeks without worsening symptoms. 90% of the time takes Humira on time; other times may be delayed due to forgetting to order Humira.  Has been exercising more regularly and started running again, with resolution of the low back pain.  No morning stiffness.  No eye pain or redness, but sometimes with a sandpaper feeling in his eyes/feels dry.    7/24/2023: Viral illnesses going throughout his household so he held Humira for a couple months with subsequently worsening low back pain that is worse in the morning and improves with time and activity.  Morning stiffness for at least 1 hour.  This past  weekend he started using naproxen 440 mg twice daily with significant improvement and he will continue this for the next 1-2 weeks.  No black or bloody stools.  No eye pain or redness.  Feels like he is improving since restarting Humira as prescribed and using naproxen.    2024: Arthritis well-controlled.  No joint pain.  Minimal morning stiffness of such a low severity and duration that he says he hardly notices it if it is present at all.  Finds that as he ages is a little bit more difficult to bend down to put on his socks but he is still able to do so.  Still able to touch toes from a standing position without difficulty.  Mild dermatitis on the extensor surfaces of his elbows and on both eyelids; has topical steroids from a previous dermatologist for the elbows.  He does not follow with a dermatologist currently.  He also notes polyuria since , where he urinates moderate volume every 1 hour that can be disruptive to his daily activities.  He has not seen urology.  He says that he has discussed this briefly with his primary care provider.  Consuming 64-96 ounces of liquid per day.  Cut out caffeine last summer with no change in urinary frequency.  No pain or burning with urination.    10/22/2024: Vasectomy last week.  Degenerative low back pain worse with increased activity, putting in new baseboards and walls in his house in preparation for a , then worse with carrying around a .  Otherwise has been doing well.  Using naproxen as needed.    Today, 2025: Gaining weight and not exercising; does not feel like he has the exercise tolerance of what he has had in the past.  He says he is a vegetarian but he also consumes a lot of energy drinks throughout the day and sugary candies that he picks up at the convenience store regularly.  Occasional neck stiffness that he says is treated with naproxen that is effective.  No heartburn.  Occasional bright red blood on the toilet paper possibly  from hemorrhoid.  Looser stools but not diarrhea.  No black stools.  Had a couple months of dry eyes possibly allergy related; no dry mouth.    Denies fevers, chills, nausea, vomiting, constipation, diarrhea. No abdominal pain. No chest pain/pressure, palpitations, or shortness of breath. No LE swelling. No neck pain. No oral or nasal sores.    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    ROS   12 point review of system was completed and negative except as noted in the HPI     Active Problem List     Patient Active Problem List   Diagnosis    AS (ankylosing spondylitis) (H)    Acne vulgaris    Vegan diet     Past Medical History     Past Medical History:   Diagnosis Date    Acne     AS (ankylosing spondylitis) (H)     Dyspepsia      Past Surgical History     Past Surgical History:   Procedure Laterality Date    HEAD & NECK SURGERY      wisdom tooth extraction    ORTHOPEDIC SURGERY      3 arthroscopic knee surgery for ACL repair     Allergy     No Known Allergies  Current Medication List     Current Outpatient Medications   Medication Sig Dispense Refill    adalimumab (HUMIRA *CF*) 40 MG/0.4ML prefilled syringe kit Inject 0.4 mLs (40 mg) subcutaneously every 14 days. . Hold for signs of infection, then seek medical attention. 2.4 mL 2    azelastine (ASTELIN) 0.1 % nasal spray Spray 2 sprays into both nostrils 2 times daily 30 mL 1    naproxen (NAPROSYN) 250 MG tablet Take 1-2 tablets (250-500 mg) by mouth 2 times daily (with meals). as needed for joint pain.  250 mg twice daily for pain 4-7/10. 500 mg twice daily for pain 8-10/10. 360 tablet 1    fluticasone (FLONASE) 50 MCG/ACT nasal spray Spray 2 sprays into both nostrils 2 times daily (Patient not taking: Reported on 11/2/2021) 16 g 2    oxymetazoline (AFRIN) 0.05 % nasal spray Spray 2 sprays into both nostrils 2 times daily       No current facility-administered  "medications for this visit.     Social History   See HPI    Family History     Family History   Problem Relation Age of Onset    Family History Negative Mother     Cancer Father         skin cancer basal cell    Family History Negative Brother     Family History Negative Sister     Family History Negative Sister     Breast Cancer Maternal Aunt      No FHx of rheumatologic disease    Physical Exam     Temp Readings from Last 3 Encounters:   11/24/20 97.3  F (36.3  C) (Tympanic)   04/09/19 98.7  F (37.1  C) (Oral)   04/01/19 98.5  F (36.9  C) (Oral)     BP Readings from Last 5 Encounters:   04/22/25 128/88   10/22/24 137/87   01/22/24 129/78   07/24/23 126/79   01/24/23 120/76     Pulse Readings from Last 1 Encounters:   04/22/25 59     Resp Readings from Last 1 Encounters:   04/22/25 16     Estimated body mass index is 31.08 kg/m  as calculated from the following:    Height as of 1/24/22: 1.727 m (5' 8\").    Weight as of this encounter: 92.7 kg (204 lb 6.4 oz).    GEN: NAD.  HEENT:  Anicteric, noninjected sclera. No obvious external lesions of the ear and nose. Hearing intact.  CV: S1, S2. RRR. No m/r/g  PULM: No increased work of breathing. CTA bilaterally   MSK: MCPs, PIPs, DIPs without swelling or tenderness to palpation.  Wrists without swelling or tenderness to palpation.  Elbows and shoulders without swelling or tenderness to palpation.  Knees, ankles, and MTPs without swelling or tenderness to palpation.  No difficulty touching toes from a standing position without bending his knees.  Modified Schober's with 5.5 cm expansion  SKIN: No rash or jaundice seen  PSYCH: Alert. Appropriate.      Labs / Imaging (select studies)     CBC  Recent Labs   Lab Test 10/22/24  1532 01/18/24  0736 01/20/23  0740 07/26/21  1132 07/24/20  0826 01/29/20  1038 04/09/19  1328   WBC 8.3 8.3 6.3   < > 5.5 6.6 7.2   RBC 4.92 5.13 5.05   < > 4.71 4.65 4.89   HGB 14.6 15.2 15.0   < > 14.8 14.1 15.1   HCT 41.5 44.8 43.6   < > 40.7 40.0 " 41.9   MCV 84 87 86   < > 86 86 86   RDW 11.5 11.5 11.5   < > 11.9 11.9 11.7    292 256   < > 218 235 258   MCH 29.7 29.6 29.7   < > 31.4 30.3 30.9   MCHC 35.2 33.9 34.4   < > 36.4 35.3 36.0   NEUTROPHIL 57 45 49   < > 40.9 46.0 53.7   LYMPH 30 38 34   < > 34.4 38.1 32.0   MONOCYTE 10 9 9   < > 10.7 10.0 10.0   EOSINOPHIL 2 7 7   < > 13.5 5.3 4.0   BASOPHIL 0 1 1   < > 0.5 0.6 0.3   ANEU  --   --   --   --  2.3 3.0 3.9   ALYM  --   --   --   --  1.9 2.5 2.3   EPI  --   --   --   --  0.6 0.7 0.7   AEOS  --   --   --   --  0.7 0.4 0.3   ABAS  --   --   --   --  0.0 0.0 0.0   ANEUTAUTO 4.8 3.7 3.1   < >  --   --   --    ALYMPAUTO 2.5 3.1 2.1   < >  --   --   --    AMONOAUTO 0.8 0.8 0.6   < >  --   --   --    AEOSAUTO 0.2 0.6 0.5   < >  --   --   --    ABSBASO 0.0 0.1 0.1   < >  --   --   --     < > = values in this interval not displayed.     CMP  Recent Labs   Lab Test 11/08/24  0754 10/22/24  1532 01/18/24  0736 01/20/23  0740 01/24/22  0914 07/26/21  1132 11/24/20  1417 07/24/20  0826 01/29/20  1038 04/09/19  1328 10/10/17  1359 03/24/17  0953   NA  --   --   --   --   --   --   --   --   --   --   --  139   POTASSIUM  --   --   --   --   --   --   --   --   --   --   --  4.1   CHLORIDE  --   --   --   --   --   --   --   --   --   --   --  104   CO2  --   --   --   --   --   --   --   --   --   --   --  29   ANIONGAP  --   --   --   --   --   --   --   --   --   --   --  6   GLC  --   --   --   --  100*  --  69*  --   --   --   --  80   BUN  --   --   --   --   --   --   --   --   --   --   --  13   CR  --  0.87 0.92 0.93 0.93   < >  --  0.93 0.84 1.04   < > 0.82   GFRESTIMATED  --  >90 >90 >90 >90   < >  --  >90 >90 >90   < > >90  Non  GFR Calc     GFRESTBLACK  --   --   --   --   --   --   --  >90 >90 >90   < > >90  African American GFR Calc     UZMA  --   --   --   --   --   --   --   --   --   --   --  8.7   BILITOTAL  --  0.5 0.5 0.6 0.5   < >  --  0.9 0.4 0.6   < > 0.5   ALBUMIN  --   4.6 4.7 4.7 4.3   < >  --  4.4 4.5 4.6   < > 4.3   PROTTOTAL  --  7.9 7.7 7.7 7.6   < >  --  8.1 7.8 8.1   < > 7.6   ALKPHOS  --  72 60 66 67   < >  --  63 54 64   < > 71   AST 26 115* 31 28 25   < >  --  29 22 23   < > 24   ALT 28 168* 27 24 38   < >  --  35 24 31   < > 29    < > = values in this interval not displayed.     Calcium/VitaminD  Recent Labs   Lab Test 03/24/17  0953   UZMA 8.7     ESR/CRP  Recent Labs   Lab Test 01/18/24  0736 01/20/23  0740 01/24/22  0914 07/26/21  1132 07/26/21  1132 07/24/20  0826   SED 8 7 4   < > 5 5   CRP  --   --  <2.9  --  <2.9 <2.9   CRPI <3.00 <3.00  --   --   --   --     < > = values in this interval not displayed.     Hepatitis B  Recent Labs   Lab Test 11/08/24  0754 07/26/21  1131   HBCAB Nonreactive Nonreactive   HEPBANG Nonreactive Nonreactive     Hepatitis C  Recent Labs   Lab Test 11/08/24  0754 07/26/21  1131   HCVAB Nonreactive Nonreactive     Tuberculosis Screening  Recent Labs   Lab Test 10/22/24  1532 01/20/23  0740 07/26/21  1133 01/29/20  1038 10/10/17  1359   TBRES Negative Negative Negative   < >  --    TBRSLT  --   --   --   --  Negative   TBAGN  --   --   --   --  0.00    < > = values in this interval not displayed.     Immunization History     Immunization History   Administered Date(s) Administered    COVID-19 MONOVALENT 12+ (Pfizer) 04/26/2021, 05/17/2021    COVID-19 Monovalent 12+ (Pfizer 2022) 01/24/2022    Influenza Vaccine >6 months,quad, PF 10/10/2017, 01/29/2020, 11/24/2020, 01/24/2022, 01/24/2023    Pneumo Conj 13-V (2010&after) 01/21/2016    Pneumococcal 23 valent 10/10/2017    TDAP (Adacel,Boostrix) 04/27/2016          Chart documentation done in part with Dragon Voice recognition Software. Although reviewed after completion, some word and grammatical error may remain.    Bobby Middleton MD

## 2025-04-22 NOTE — NURSING NOTE
RAPID3 (0-30) Cumulative Score  3.5          RAPID3 Weighted Score (divide #4 by 3 and that is the weighted score)  1.1

## 2025-04-22 NOTE — PATIENT INSTRUCTIONS
RHEUMATOLOGY    [facility]  [address] [place] [address]  [place] [address]    Phone number: [phone]  Fax number: [phone]    If you need a medication refill, please contact us as you may need lab work and/or a follow up visit prior to your refill.      Thank you for choosing [facility]!    Kassidy Dave CMA Rheumatology

## 2025-04-23 LAB
ALBUMIN SERPL BCG-MCNC: 4.6 G/DL (ref 3.5–5.2)
ALP SERPL-CCNC: 65 U/L (ref 40–150)
ALT SERPL W P-5'-P-CCNC: 39 U/L (ref 0–70)
AST SERPL W P-5'-P-CCNC: 39 U/L (ref 0–45)
BILIRUB DIRECT SERPL-MCNC: 0.18 MG/DL (ref 0–0.3)
BILIRUB SERPL-MCNC: 0.5 MG/DL
CREAT SERPL-MCNC: 0.9 MG/DL (ref 0.67–1.17)
CRP SERPL-MCNC: <3 MG/L
EGFRCR SERPLBLD CKD-EPI 2021: >90 ML/MIN/1.73M2
PROT SERPL-MCNC: 7.8 G/DL (ref 6.4–8.3)

## 2025-07-21 ENCOUNTER — PATIENT OUTREACH (OUTPATIENT)
Dept: CARE COORDINATION | Facility: CLINIC | Age: 35
End: 2025-07-21
Payer: COMMERCIAL

## 2025-08-04 ENCOUNTER — PATIENT OUTREACH (OUTPATIENT)
Dept: CARE COORDINATION | Facility: CLINIC | Age: 35
End: 2025-08-04
Payer: COMMERCIAL